# Patient Record
Sex: MALE | Race: WHITE | Employment: FULL TIME | URBAN - METROPOLITAN AREA
[De-identification: names, ages, dates, MRNs, and addresses within clinical notes are randomized per-mention and may not be internally consistent; named-entity substitution may affect disease eponyms.]

---

## 2017-02-20 ENCOUNTER — OFFICE VISIT (OUTPATIENT)
Dept: INTERNAL MEDICINE CLINIC | Age: 62
End: 2017-02-20

## 2017-02-20 VITALS
RESPIRATION RATE: 12 BRPM | SYSTOLIC BLOOD PRESSURE: 120 MMHG | HEART RATE: 44 BPM | TEMPERATURE: 97.8 F | WEIGHT: 199.8 LBS | DIASTOLIC BLOOD PRESSURE: 73 MMHG | BODY MASS INDEX: 29.59 KG/M2 | HEIGHT: 69 IN

## 2017-02-20 DIAGNOSIS — E55.9 VITAMIN D DEFICIENCY: ICD-10-CM

## 2017-02-20 DIAGNOSIS — Z00.00 WELL ADULT EXAM: Primary | ICD-10-CM

## 2017-02-20 DIAGNOSIS — Z77.090 EXPOSURE TO ASBESTOS: ICD-10-CM

## 2017-02-20 DIAGNOSIS — J45.20 MILD INTERMITTENT ASTHMA WITHOUT COMPLICATION: ICD-10-CM

## 2017-02-20 DIAGNOSIS — K64.8 INTERNAL HEMORRHOIDS: ICD-10-CM

## 2017-02-20 RX ORDER — HYDROCORTISONE ACETATE 25 MG/1
25 SUPPOSITORY RECTAL 2 TIMES DAILY
Qty: 24 SUPPOSITORY | Refills: 1 | Status: SHIPPED | OUTPATIENT
Start: 2017-02-20 | End: 2017-11-10 | Stop reason: SDUPTHER

## 2017-02-20 NOTE — PROGRESS NOTES
Radha Kiran is a 62 y.o. male  Presenting for his annual checkup and health maintenance review and follow-up    Not taking vit D for 1 month    Reports his right hip pain is improved, but he has some limited ROM and inability to cross leg. Working full-time without restriction. Exercise: not active  Diet: generally follows a low fat low cholesterol diet  Health Maintenance   Topic Date Due    COLONOSCOPY  01/16/2018    DTaP/Tdap/Td series (3 - Td) 02/15/2026    Hepatitis C Screening  Completed    ZOSTER VACCINE AGE 60>  Completed    Pneumococcal 19-64 Medium Risk  Addressed    INFLUENZA AGE 9 TO ADULT  Addressed     Health Maintenance reviewed  Last digital rectal exam:   Lab Results   Component Value Date/Time    Prostate Specific Ag 0.2 02/15/2016 10:05 AM    Prostate Specific Ag 0.2 09/16/2013 03:01 PM    Prostate Specific Ag 0.4 01/16/2012 12:00 AM       Vaccinations reviewed  Immunization History   Administered Date(s) Administered    Influenza Vaccine 09/16/2013    TDAP Vaccine 01/16/2005    Tdap 05/07/2014, 02/15/2016    Zoster Vaccine, Live 01/02/2017       Past Medical History   Diagnosis Date    Asthma      mild intermittent    Basal cell carcinoma      face, chest?  Dr. Sergio Perez    Chronic sinus brady[facility] ([facility] [address]     Generalized headaches      2008 MRI normal    Insomnia      normal sleep study 2008    Internal hemorrhoids     Low back pain      recurrent, sometimes w L leg numbness    Normal echocardiogram 1/2012    Pericarditis 2011     idiopathic    Plantar fasciitis of left foot      Dr. Jena Wilson. tear 1/14/14. MRI 3/2014 showed tear    Radicular pain of right lower back 6/15/16    Rupture of rectus femoris tendon 06/2016     MRI 7/18/16      has a past surgical history that includes heart catheterization (2011); colonoscopy (3/22/07); and orthopaedic (Left).     Percocet [oxycodone-acetaminophen] and Sulfa (sulfonamide antibiotics)   Current Outpatient Prescriptions Medication Sig    hydrocortisone (ANUSOL-HC) 25 mg supp Insert 1 Suppository into rectum two (2) times a day. Indications: HEMORRHOIDS    folic acid (FOLVITE) 1 mg tablet Take 1 Tab by mouth daily.  cholecalciferol (VITAMIN D3) 1,000 unit tablet Take 1 Tab by mouth daily.  albuterol (PROVENTIL HFA, VENTOLIN HFA, PROAIR HFA) 90 mcg/actuation inhaler Take 2 Puffs by inhalation every six (6) hours as needed for Wheezing.  ASMANEX TWISTHALER 220 mcg (30 doses) inhaler Take 2 Puffs by inhalation daily.  VITAMIN B-12 1,000 mcg tablet Take 1 Tab by mouth daily.  omeprazole (PRILOSEC) 20 mg capsule Take 1 capsule by mouth two (2) times a day. No current facility-administered medications for this visit. SOCIAL HX:  reports that he has never smoked. He has never used smokeless tobacco. He reports that he does not drink alcohol. FAMILY HX: family history includes Arthritis-rheumatoid in his sister; Cancer in his mother; Heart Disease in his father; SLE in his sister; Stroke in his father. Review of Systems - History obtained from the patient  General ROS: negative for - night sweats, weight gain or weight loss  Cardiovascular ROS: no chest pain, dyspnea on exertion, edema    Physical exam  Blood pressure 120/73, pulse (!) 44, temperature 97.8 °F (36.6 °C), temperature source Oral, resp. rate 12, height 5' 8.5\" (1.74 m), weight 199 lb 12.8 oz (90.6 kg). Wt Readings from Last 3 Encounters:   02/20/17 199 lb 12.8 oz (90.6 kg)   10/12/16 193 lb 12.8 oz (87.9 kg)   09/02/16 193 lb 12.8 oz (87.9 kg)     He appears well, alert and oriented x 3, pleasant and cooperative. Vitals as noted. No rashes or significant lesions. Neck supple and free of adenopathy, or masses. No thyromegaly or carotid bruits. Cranial nerves normal. Lungs are clear to auscultation. Heart sounds are normal with no murmurs, clicks, gallops or rubs. Abdomen is soft, non- tender, with no masses or organomegaly.  Extremities, peripheral pulses and reflexes are normal.  . RECTAL/PROSTATE EXAM: deferred  . Skin is without rashes or suspicious lesions. Assessment and Plan:    1. Well adult exam  Can RTC for ANNAMARIE prn.     - hydrocortisone (ANUSOL-HC) 25 mg supp; Insert 1 Suppository into rectum two (2) times a day. Indications: HEMORRHOIDS  Dispense: 24 Suppository; Refill: 1  - LIPID PANEL  - CBC W/O DIFF  - METABOLIC PANEL, COMPREHENSIVE  - PROSTATE SPECIFIC AG    2. Mild intermittent asthma without complication  Mild and stable  - XR CHEST PA LAT; Future    3. Exposure to asbestos - possible hx of this. Check CXR  - XR CHEST PA LAT; Future    4. Internal hemorrhoids  Currently asymptomatic    5.  Vitamin D deficiency  Not taking supplement for 1 months  - VITAMIN D, 25 HYDROXY      The patient is asked to make an attempt to improve diet and exercise patterns  Avoid tobacco products, excessive alcohol    Return for yearly wellness visits

## 2017-02-20 NOTE — PATIENT INSTRUCTIONS

## 2017-02-20 NOTE — MR AVS SNAPSHOT
Visit Information Date & Time Provider Department Dept. Phone Encounter #  
 2/20/2017  4:15 PM Cecily Gayle MD [facility] [address] [id number] Upcoming Health Maintenance Date Due COLONOSCOPY 1/16/2018 DTaP/Tdap/Td series (3 - Td) 2/15/2026 Allergies as of 2/20/2017  Review Complete On: 2/20/2017 By: Rachelle Rust Severity Noted Reaction Type Reactions Percocet [Oxycodone-acetaminophen] Medium 07/11/2016    Rash  
 Sulfa (Sulfonamide Antibiotics) Low 01/16/2012   Side Effect Other (comments) Current Immunizations  Reviewed on 2/26/2016 Name Date Influenza Vaccine 9/16/2013 TDAP Vaccine 1/16/2005 Tdap 2/15/2016, 5/7/2014 Zoster Vaccine, Live 1/2/2017 Not reviewed this visit You Were Diagnosed With   
  
 Codes Comments Well adult exam    -  Primary ICD-10-CM: Z00.00 ICD-9-CM: V70.0 Mild intermittent asthma without complication     AO-92-PM: J45.20 ICD-9-CM: 493.90 Exposure to asbestos     ICD-10-CM: Z77.090 
ICD-9-CM: V15.84 Internal hemorrhoids     ICD-10-CM: K64.8 ICD-9-CM: 455.0 Vitamin D deficiency     ICD-10-CM: E55.9 ICD-9-CM: 268.9 Vitals BP Pulse Temp Resp Height(growth percentile) Weight(growth percentile) 120/73 (BP 1 Location: Left arm, BP Patient Position: Sitting) (!) 44 97.8 °F (36.6 °C) (Oral) 12 5' 8.5\" (1.74 m) 199 lb 12.8 oz (90.6 kg) BMI Smoking Status 29.94 kg/m2 Never Smoker Vitals History BMI and BSA Data Body Mass Index Body Surface Area  
 29.94 kg/m 2 2.09 m 2 Preferred Pharmacy Pharmacy Name Phone [facility], [facility] 53 91 [address] ([address] [phone] Your Updated Medication List  
  
   
This list is accurate as of: 2/20/17  4:48 PM.  Always use your most recent med list.  
  
  
  
  
 albuterol 90 mcg/actuation inhaler Commonly known as:  PROVENTIL HFA, VENTOLIN HFA, PROAIR HFA Take 2 Puffs by inhalation every six (6) hours as needed for Wheezing. ASMANEX TWISTHALER 220 mcg (30 doses) inhaler Generic drug:  mometasone Take 2 Puffs by inhalation daily. cholecalciferol 1,000 unit tablet Commonly known as:  VITAMIN D3 Take 1 Tab by mouth daily. folic acid 1 mg tablet Commonly known as:  Google Take 1 Tab by mouth daily. hydrocortisone 25 mg Supp Commonly known as:  ANUSOL-HC Insert 1 Suppository into rectum two (2) times a day. Indications: HEMORRHOIDS  
  
 omeprazole 20 mg capsule Commonly known as:  PriLOSEC Take 1 capsule by mouth two (2) times a day. VITAMIN B-12 1,000 mcg tablet Generic drug:  cyanocobalamin Take 1 Tab by mouth daily. Prescriptions Sent to Pharmacy Refills  
 hydrocortisone (ANUSOL-HC) 25 mg supp 1 Sig: Insert 1 Suppository into rectum two (2) times a day. Indications: HEMORRHOIDS Class: Normal  
 Pharmacy: [facility] [address] #: [phone] Route: Rectal  
  
We Performed the Following CBC W/O DIFF [40653 CPT(R)] LIPID PANEL [99725 CPT(R)] METABOLIC PANEL, COMPREHENSIVE [34284 CPT(R)] PROSTATE SPECIFIC AG (PSA) [id number] CPT(R)] VITAMIN D, 25 HYDROXY [id number] CPT(R)] To-Do List   
 Around 02/20/2017 Imaging:  XR CHEST PA LAT Patient Instructions Well Visit, Men 48 to 72: Care Instructions Your Care Instructions Physical exams can help you stay healthy. Your doctor has checked your overall health and may have suggested ways to take good care of yourself. He or she also may have recommended tests. At home, you can help prevent illness with healthy eating, regular exercise, and other steps. Follow-up care is a key part of your treatment and safety.  Be sure to make and go to all appointments, and call your doctor if you are having problems. It's also a good idea to know your test results and keep a list of the medicines you take. How can you care for yourself at home? · Reach and stay at a healthy weight. This will lower your risk for many problems, such as obesity, diabetes, heart disease, and high blood pressure. · Get at least 30 minutes of exercise on most days of the week. Walking is a good choice. You also may want to do other activities, such as running, swimming, cycling, or playing tennis or team sports. · Do not smoke. Smoking can make health problems worse. If you need help quitting, talk to your doctor about stop-smoking programs and medicines. These can increase your chances of quitting for good. · Protect your skin from too much sun. When you're outdoors from 10 a.m. to 4 p.m., stay in the shade or cover up with clothing and a hat with a wide brim. Wear sunglasses that block UV rays. Even when it's cloudy, put broad-spectrum sunscreen (SPF 30 or higher) on any exposed skin. · See a dentist one or two times a year for checkups and to have your teeth cleaned. · Wear a seat belt in the car. · Limit alcohol to 2 drinks a day. Too much alcohol can cause health problems. Follow your doctor's advice about when to have certain tests. These tests can spot problems early. · Cholesterol. Your doctor will tell you how often to have this done based on your overall health and other things that can increase your risk for heart attack and stroke. · Blood pressure. Have your blood pressure checked during a routine doctor visit. Your doctor will tell you how often to check your blood pressure based on your age, your blood pressure results, and other factors. · Prostate exam. Talk to your doctor about whether you should have a blood test (called a PSA test) for prostate cancer.  Experts disagree on whether men should have this test. Some experts recommend that you discuss the benefits and risks of the test with your doctor. · Diabetes. Ask your doctor whether you should have tests for diabetes. · Vision. Some experts recommend that you have yearly exams for glaucoma and other age-related eye problems starting at age 48. · Hearing. Tell your doctor if you notice any change in your hearing. You can have tests to find out how well you hear. · Colon cancer. You should begin tests for colon cancer at age 48. You may have one of several tests. Your doctor will tell you how often to have tests based on your age and risk. Risks include whether you already had a precancerous polyp removed from your colon or whether your parent, brother, sister, or child has had colon cancer. · Heart attack and stroke risk. At least every 4 to 6 years, you should have your risk for heart attack and stroke assessed. Your doctor uses factors such as your age, blood pressure, cholesterol, and whether you smoke or have diabetes to show what your risk for a heart attack or stroke is over the next 10 years. · Abdominal aortic aneurysm. Ask your doctor whether you should have a test to check for an aneurysm. You may need a test if you ever smoked or if your parent, brother, sister, or child has had an aneurysm. When should you call for help? Watch closely for changes in your health, and be sure to contact your doctor if you have any problems or symptoms that concern you. Where can you learn more? Go to http://nuha-leandro.info/. Enter K778 in the search box to learn more about \"Well Visit, Men 48 to 72: Care Instructions. \" Current as of: July 19, 2016 Content Version: 11.1 © 8230-8929 Healthwise, Incorporated. Care instructions adapted under license by [employer] (which disclaims liability or warranty for this information).  If you have questions about a medical condition or this instruction, always ask your healthcare professional. [address] any warranty or liability for your use of this information. Introducing [facility] & HEALTH SERVICES! Dear Cinthia Carbone: 
Thank you for requesting a [employer] account. Our records indicate that you already have an active [employer] account. You can access your account anytime at [email or web address] Did you know that you can access your hospital and ER discharge instructions at any time in [employer]? You can also review all of your test results from your hospital stay or ER visit. Additional Information If you have questions, please visit the Frequently Asked Questions section of the [employer] website at [email or web address] Remember, [employer] is NOT to be used for urgent needs. For medical emergencies, dial 911. Now available from your iPhone and Android! Please provide this summary of care documentation to your next provider. Your primary care clinician is listed as Radha Vo. If you have any questions after today's visit, please call [phone].

## 2017-04-11 ENCOUNTER — HOSPITAL ENCOUNTER (OUTPATIENT)
Dept: GENERAL RADIOLOGY | Age: 62
Discharge: HOME | End: 2017-04-11
Attending: INTERNAL MEDICINE
Payer: COMMERCIAL

## 2017-04-11 DIAGNOSIS — J45.20 MILD INTERMITTENT ASTHMA WITHOUT COMPLICATION: ICD-10-CM

## 2017-04-11 DIAGNOSIS — Z77.090 EXPOSURE TO ASBESTOS: ICD-10-CM

## 2017-04-11 PROCEDURE — 71020 XR CHEST PA LAT: CPT

## 2017-04-12 LAB
ALBUMIN SERPL-MCNC: 4.4 G/DL (ref 3.6–4.8)
ALBUMIN/GLOB SERPL: 1.5 {RATIO} (ref 1.2–2.2)
ALP SERPL-CCNC: 60 IU/L (ref 39–117)
ALT SERPL-CCNC: 19 IU/L (ref 0–44)
AST SERPL-CCNC: 20 IU/L (ref 0–40)
BILIRUB SERPL-MCNC: 0.4 MG/DL (ref 0–1.2)
BUN SERPL-MCNC: 15 MG/DL (ref 8–27)
BUN/CREAT SERPL: 14 (ref 10–24)
CALCIUM SERPL-MCNC: 9.2 MG/DL (ref 8.6–10.2)
CHLORIDE SERPL-SCNC: 98 MMOL/L (ref 96–106)
CHOLEST SERPL-MCNC: 210 MG/DL (ref 100–199)
CO2 SERPL-SCNC: 25 MMOL/L (ref 18–29)
CREAT SERPL-MCNC: 1.06 MG/DL (ref 0.76–1.27)
ERYTHROCYTE [DISTWIDTH] IN BLOOD BY AUTOMATED COUNT: 13 % (ref 12.3–15.4)
GLOBULIN SER CALC-MCNC: 3 G/DL (ref 1.5–4.5)
GLUCOSE SERPL-MCNC: 94 MG/DL (ref 65–99)
HCT VFR BLD AUTO: 45.9 % (ref 37.5–51)
HDLC SERPL-MCNC: 62 MG/DL
HGB BLD-MCNC: 16.1 G/DL (ref 12.6–17.7)
INTERPRETATION, 910389: NORMAL
LDLC SERPL CALC-MCNC: 134 MG/DL (ref 0–99)
MCH RBC QN AUTO: 31.7 PG (ref 26.6–33)
MCHC RBC AUTO-ENTMCNC: 35.1 G/DL (ref 31.5–35.7)
MCV RBC AUTO: 90 FL (ref 79–97)
PLATELET # BLD AUTO: 236 X10E3/UL (ref 150–379)
POTASSIUM SERPL-SCNC: 4.7 MMOL/L (ref 3.5–5.2)
PROT SERPL-MCNC: 7.4 G/DL (ref 6–8.5)
PSA SERPL-MCNC: 0.2 NG/ML (ref 0–4)
RBC # BLD AUTO: 5.08 X10E6/UL (ref 4.14–5.8)
SODIUM SERPL-SCNC: 140 MMOL/L (ref 134–144)
TRIGL SERPL-MCNC: 71 MG/DL (ref 0–149)
VLDLC SERPL CALC-MCNC: 14 MG/DL (ref 5–40)
WBC # BLD AUTO: 5.4 X10E3/UL (ref 3.4–10.8)

## 2017-05-08 ENCOUNTER — OFFICE VISIT (OUTPATIENT)
Dept: INTERNAL MEDICINE CLINIC | Age: 62
End: 2017-05-08

## 2017-05-08 VITALS
BODY MASS INDEX: 30.07 KG/M2 | WEIGHT: 203 LBS | HEART RATE: 52 BPM | SYSTOLIC BLOOD PRESSURE: 127 MMHG | HEIGHT: 69 IN | DIASTOLIC BLOOD PRESSURE: 74 MMHG | RESPIRATION RATE: 20 BRPM | TEMPERATURE: 97.7 F

## 2017-05-08 DIAGNOSIS — R00.1 CHRONIC SINUS BRADYCARDIA: ICD-10-CM

## 2017-05-08 DIAGNOSIS — J45.20 MILD INTERMITTENT ASTHMA WITHOUT COMPLICATION: ICD-10-CM

## 2017-05-08 DIAGNOSIS — R53.83 FATIGUE, UNSPECIFIED TYPE: Primary | ICD-10-CM

## 2017-05-08 NOTE — PROGRESS NOTES
HISTORY OF PRESENT ILLNESS    Chief Complaint   Patient presents with    Follow-up       Presents for follow-up. He would like a physical.    He was billed for CPE 2/2/17 since we addressed wellness needs, but complete physical exam not done. No acute concerns. He ws concerned about mild fatigue and feeling his memory was not quite as sharp, so he started prevagen and some vitamin supplements      Review of Systems   All other systems reviewed and are negative, except as noted in HPI    Past Medical and Surgical History   has a past medical history of Asthma; Basal cell carcinoma; Chronic sinus bradycardia ([address] Generalized headaches; Insomnia; Internal hemorrhoids; Low back pain; Normal echocardiogram (1/2012); Pericarditis (2011); Plantar fasciitis of left foot; Radicular pain of right lower back (6/15/16); and Rupture of rectus femoris tendon (06/2016). has a past surgical history that includes heart catheterization (2011); colonoscopy (3/22/07); and orthopaedic (Left). reports that he has never smoked. He has never used smokeless tobacco. He reports that he does not drink alcohol.  family history includes Arthritis-rheumatoid in his sister; Cancer in his mother; Heart Disease in his father; SLE in his sister; Stroke in his father. Physical Exam   Nursing note and vitals reviewed. Blood pressure 127/74, pulse (!) 52, temperature 97.7 °F (36.5 °C), temperature source Oral, resp. rate 20, height 5' 8.5\" (1.74 m), weight 203 lb (92.1 kg). Constitutional:  No distress. Eyes: Conjunctivae are normal.   Ears:  Hearing grossly intact  Cardiovascular: Normal rate. regular rhythm, no murmurs or gallops  No edema  Pulmonary/Chest: Effort normal.   CTAB  Musculoskeletal: moves all 4 extremities   Neurological: Alert and oriented to person, place, and time. Skin: No rash noted. Psychiatric: Normal mood and affect.  Behavior is normal.   ANNAMARIE - normal prostate, no nodules    ASSESSMENT and Luisa Schmidt was seen today for follow-up. Diagnoses and all orders for this visit:    Fatigue, unspecified type- mild. Unclear benefits from his vitamins    Mild intermittent asthma without complication - Controlled on current regimen. Continue current medications as written in chart    Chronic sinus bradycardia (HCC) - Currently asymptomatic    lab results and schedule of future lab studies reviewed with patient  reviewed medications and side effects in detail    Return to clinic for further evaluation if new symptoms develop    Follow-up Disposition: Not on File    Current Outpatient Prescriptions   Medication Sig    multivitamin, tx-iron-ca-min (THERA-M W/ IRON) 9 mg iron-400 mcg tab tablet Take 1 Tab by mouth daily.  OTHER Prevagen daily    VIT C/VIT E/LUTEIN/MIN/OMEGA-3 (OCUVITE PO) Take  by mouth.  hydrocortisone (ANUSOL-HC) 25 mg supp Insert 1 Suppository into rectum two (2) times a day. Indications: HEMORRHOIDS    folic acid (FOLVITE) 1 mg tablet Take 1 Tab by mouth daily.  cholecalciferol (VITAMIN D3) 1,000 unit tablet Take 1 Tab by mouth daily.  albuterol (PROVENTIL HFA, VENTOLIN HFA, PROAIR HFA) 90 mcg/actuation inhaler Take 2 Puffs by inhalation every six (6) hours as needed for Wheezing.  ASMANEX TWISTHALER 220 mcg (30 doses) inhaler Take 2 Puffs by inhalation daily.  omeprazole (PRILOSEC) 20 mg capsule Take 1 capsule by mouth two (2) times a day. No current facility-administered medications for this visit.

## 2017-05-08 NOTE — MR AVS SNAPSHOT
Visit Information Date & Time Provider Department Dept. Phone Encounter #  
 5/8/2017  2:45 PM Juliet Rosales MD [facility] [address] [id number] Upcoming Health Maintenance Date Due INFLUENZA AGE 9 TO ADULT 8/1/2017 COLONOSCOPY 1/16/2018 DTaP/Tdap/Td series (3 - Td) 2/15/2026 Allergies as of 5/8/2017  Review Complete On: 5/8/2017 By: Litzy Stephens Severity Noted Reaction Type Reactions Percocet [Oxycodone-acetaminophen] Medium 07/11/2016    Rash  
 Sulfa (Sulfonamide Antibiotics) Low 01/16/2012   Side Effect Other (comments) Current Immunizations  Reviewed on 5/8/2017 Name Date Influenza Vaccine 9/16/2013 TDAP Vaccine 1/16/2005 Tdap 2/15/2016, 5/7/2014 Zoster Vaccine, Live 1/2/2017 Reviewed by Juliet Roasles MD on 5/8/2017 at  3:37 PM  
You Were Diagnosed With   
  
 Codes Comments Fatigue, unspecified type    -  Primary ICD-10-CM: R53.83 ICD-9-CM: 780.79 Mild intermittent asthma without complication     [facility]-: J45.20 ICD-9-CM: 493.90 Chronic sinus bradycardia (HCC)     ICD-10-CM: R00.1 ICD-9-CM: 427.81 Vitals BP Pulse Temp Resp Height(growth percentile) Weight(growth percentile) 127/74 (BP 1 Location: Left arm, BP Patient Position: Sitting) (!) 52 97.7 °F (36.5 °C) (Oral) 20 5' 8.5\" (1.74 m) 203 lb (92.1 kg) BMI Smoking Status 30.42 kg/m2 Never Smoker Vitals History BMI and BSA Data Body Mass Index Body Surface Area  
 30.42 kg/m 2 2.11 m 2 Preferred Pharmacy Pharmacy Name Phone 310 [facility], [facility] 53 [address] ([address] [phone] Your Updated Medication List  
  
   
This list is accurate as of: 5/8/17  3:54 PM.  Always use your most recent med list.  
  
  
  
  
 albuterol 90 mcg/actuation inhaler Commonly known as:  PROVENTIL HFA, VENTOLIN HFA, PROAIR HFA  
 Take 2 Puffs by inhalation every six (6) hours as needed for Wheezing. ASMANEX TWISTHALER 220 mcg (30 doses) inhaler Generic drug:  mometasone Take 2 Puffs by inhalation daily. cholecalciferol 1,000 unit tablet Commonly known as:  VITAMIN D3 Take 1 Tab by mouth daily. folic acid 1 mg tablet Commonly known as:  Google Take 1 Tab by mouth daily. hydrocortisone 25 mg Supp Commonly known as:  ANUSOL-HC Insert 1 Suppository into rectum two (2) times a day. Indications: HEMORRHOIDS  
  
 multivitamin, tx-iron-ca-min 9 mg iron-400 mcg Tab tablet Commonly known as:  THERA-M w/ IRON Take 1 Tab by mouth daily. OCUVITE PO Take  by mouth. omeprazole 20 mg capsule Commonly known as:  PriLOSEC Take 1 capsule by mouth two (2) times a day. OTHER Prevagen daily Introducing [facility] & [facility] SERVICES! Dear Cherl Blizzard: 
Thank you for requesting a [employer] account. Our records indicate that you already have an active [employer] account. You can access your account anytime at [email or web address] Did you know that you can access your hospital and ER discharge instructions at any time in [employer] You can also review all of your test results from your hospital stay or ER visit. Additional Information If you have questions, please visit the Frequently Asked Questions section of the [employer] website at [email or web address] Remember, [employer] is NOT to be used for urgent needs. For medical emergencies, dial 911. Now available from your iPhone and Android! Please provide this summary of care documentation to your next provider. Your primary care clinician is listed as Tammi Foley. If you have any questions after today's visit, please call [phone].

## 2017-06-05 DIAGNOSIS — J45.20 MILD INTERMITTENT ASTHMA WITHOUT COMPLICATION: ICD-10-CM

## 2017-06-05 RX ORDER — MOMETASONE FUROATE 220 UG/1
2 INHALANT RESPIRATORY (INHALATION) DAILY
Qty: 1 INHALER | Refills: 11 | Status: SHIPPED | OUTPATIENT
Start: 2017-06-05 | End: 2017-06-07 | Stop reason: SDUPTHER

## 2017-06-07 DIAGNOSIS — J45.20 MILD INTERMITTENT ASTHMA WITHOUT COMPLICATION: ICD-10-CM

## 2017-06-16 RX ORDER — FLUTICASONE PROPIONATE 220 UG/1
1 AEROSOL, METERED RESPIRATORY (INHALATION) 2 TIMES DAILY
Qty: 1 INHALER | Refills: 3 | Status: SHIPPED | OUTPATIENT
Start: 2017-06-16 | End: 2019-01-03 | Stop reason: ALTCHOICE

## 2017-11-10 DIAGNOSIS — Z00.00 WELL ADULT EXAM: ICD-10-CM

## 2017-11-10 RX ORDER — HYDROCORTISONE ACETATE 25 MG/1
25 SUPPOSITORY RECTAL 2 TIMES DAILY
Qty: 24 SUPPOSITORY | Refills: 1 | Status: SHIPPED | OUTPATIENT
Start: 2017-11-10 | End: 2018-05-02 | Stop reason: ALTCHOICE

## 2017-12-06 ENCOUNTER — TELEPHONE (OUTPATIENT)
Dept: INTERNAL MEDICINE CLINIC | Age: 62
End: 2017-12-06

## 2017-12-06 NOTE — TELEPHONE ENCOUNTER
Pt's wife called , Radha Shoulder. States pt is having right testicle pain that shoots into his abd. Pt is very uncomfortable. Pt is scheduled for Monday but Wife was hoping they can be seen sooner.

## 2018-05-02 ENCOUNTER — OFFICE VISIT (OUTPATIENT)
Dept: INTERNAL MEDICINE CLINIC | Age: 63
End: 2018-05-02

## 2018-05-02 VITALS
WEIGHT: 199.2 LBS | OXYGEN SATURATION: 96 % | SYSTOLIC BLOOD PRESSURE: 126 MMHG | RESPIRATION RATE: 16 BRPM | DIASTOLIC BLOOD PRESSURE: 79 MMHG | HEART RATE: 54 BPM | BODY MASS INDEX: 29.51 KG/M2 | TEMPERATURE: 98.9 F | HEIGHT: 69 IN

## 2018-05-02 DIAGNOSIS — M77.8 LEFT ELBOW TENDONITIS: Primary | ICD-10-CM

## 2018-05-02 DIAGNOSIS — R25.2 HAND CRAMP: ICD-10-CM

## 2018-05-02 RX ORDER — PYRIDOXINE HCL (VITAMIN B6) 100 MG
100 TABLET ORAL DAILY
COMMUNITY
End: 2019-11-11 | Stop reason: ALTCHOICE

## 2018-05-02 RX ORDER — DICLOFENAC SODIUM 75 MG/1
75 TABLET, DELAYED RELEASE ORAL 2 TIMES DAILY
Qty: 60 TAB | Refills: 0 | Status: SHIPPED | OUTPATIENT
Start: 2018-05-02 | End: 2018-09-07 | Stop reason: ALTCHOICE

## 2018-05-02 NOTE — MR AVS SNAPSHOT
[place] 
 
 
 [address] [facility] 
[phone] Patient: Joe Mullins MRN: [id number] A:[birth year] Visit Information Date & Time Provider Department Dept. Phone Encounter #  
 5/2/2018 11:30 AM Noe Irizarry MD [facility] [address] [place]  [id number] Upcoming Health Maintenance Date Due Influenza Age 5 to Adult 8/1/2018 DTaP/Tdap/Td series (3 - Td) 2/15/2026 COLONOSCOPY 9/22/2027 Allergies as of 5/2/2018  Review Complete On: 5/2/2018 By: Noe Irizarry MD  
  
 Severity Noted Reaction Type Reactions Percocet [Oxycodone-acetaminophen] Medium 07/11/2016    Rash  
 Sulfa (Sulfonamide Antibiotics) Low 01/16/2012   Side Effect Other (comments) Current Immunizations  Reviewed on 5/8/2017 Name Date Influenza Vaccine 9/16/2013 TDAP Vaccine 1/16/2005 Tdap 2/15/2016, 5/7/2014 Zoster Vaccine, Live 1/2/2017 Not reviewed this visit You Were Diagnosed With   
  
 Codes Comments Left elbow tendonitis    -  Primary ICD-10-CM: M77.8 ICD-9-CM: 727.09 Hand cramp     ICD-10-CM: R25.2 ICD-9-CM: 729.82 Vitals BP Pulse Temp Resp Height(growth percentile) Weight(growth percentile) 126/79 (!) 54 98.9 °F (37.2 °C) (Oral) 16 5' 8.5\" (1.74 m) 199 lb 3.2 oz (90.4 kg) SpO2 BMI Smoking Status 96% 29.85 kg/m2 Never Smoker BMI and BSA Data Body Mass Index Body Surface Area  
 29.85 kg/m 2 2.09 m 2 Preferred Pharmacy Pharmacy Name Phone [address] [facility], [facility] [address] ([phone] Your Updated Medication List  
  
   
This list is accurate as of 5/2/18 12:25 PM.  Always use your most recent med list.  
  
  
  
  
 albuterol 90 mcg/actuation inhaler Commonly known as:  PROVENTIL HFA, VENTOLIN HFA, PROAIR HFA  
 Take 2 Puffs by inhalation every six (6) hours as needed for Wheezing. cholecalciferol 1,000 unit tablet Commonly known as:  VITAMIN D3 Take 1 Tab by mouth daily. diclofenac EC 75 mg EC tablet Commonly known as:  VOLTAREN Take 1 Tab by mouth two (2) times a day. For pain  
  
 fluticasone 220 mcg/actuation inhaler Commonly known as:  FLOVENT HFA Take 1 Puff by inhalation two (2) times a day. mometasone 220 mcg (120 doses) Aepb inhaler Commonly known as:  Scherrie  Take 1 Puff by inhalation two (2) times a day. multivitamin, tx-iron-ca-min 9 mg iron-400 mcg Tab tablet Commonly known as:  THERA-M w/ IRON Take 1 Tab by mouth daily. OCUVITE PO Take  by mouth. omeprazole 20 mg capsule Commonly known as:  PriLOSEC Take 1 capsule by mouth two (2) times a day. VITAMIN B-6 100 mg tablet Generic drug:  pyridoxine (vitamin B6) Take 100 mg by mouth daily. Prescriptions Sent to Pharmacy Refills  
 diclofenac EC (VOLTAREN) 75 mg EC tablet 0 Sig: Take 1 Tab by mouth two (2) times a day. For pain  
 Class: Normal  
 Pharmacy: [facility] [address] #: [phone] Route: Oral  
  
Patient Instructions Tennis Elbow: Exercises Your Care Instructions Here are some examples of typical rehabilitation exercises for your condition. Start each exercise slowly. Ease off the exercise if you start to have pain. Your doctor or physical therapist will tell you when you can start these exercises and which ones will work best for you. How to do the exercises Wrist flexor stretch 1. Extend your arm in front of you with your palm up. 2. Bend your wrist, pointing your hand toward the floor. 3. With your other hand, gently bend your wrist farther until you feel a mild to moderate stretch in your forearm. 4. Hold for at least 15 to 30 seconds. Repeat 2 to 4 times. Wrist extensor stretch 1. Repeat steps 1 to 4 of the stretch above but begin with your extended hand palm down. Ball or sock squeeze 1. Hold a tennis ball (or a rolled-up sock) in your hand. 2. Make a fist around the ball (or sock) and squeeze. 3. Hold for about 6 seconds, and then relax for up to 10 seconds. 4. Repeat 8 to 12 times. 5. Switch the ball (or sock) to your other hand and do 8 to 12 times. Wrist deviation 1. Sit so that your arm is supported but your hand hangs off the edge of a flat surface, such as a table. 2. Hold your hand out like you are shaking hands with someone. 3. Move your hand up and down. 4. Repeat this motion 8 to 12 times. 5. Switch arms. 6. Try to do this exercise twice with each hand. Wrist curls 1. Place your forearm on a table with your hand hanging over the edge of the table, palm up. 2. Place a 1- to 2-pound weight in your hand. This may be a dumbbell, a can of food, or a filled water bottle. 3. Slowly raise and lower the weight while keeping your forearm on the table and your palm facing up. 4. Repeat this motion 8 to 12 times. 5. Switch arms, and do steps 1 through 4. 
6. Repeat with your hand facing down toward the floor. Switch arms. Biceps curls 1. Sit leaning forward with your legs slightly spread and your left hand on your left thigh. 2. Place your right elbow on your right thigh, and hold the weight with your forearm horizontal. 
3. Slowly curl the weight up and toward your chest. 
4. Repeat this motion 8 to 12 times. 5. Switch arms, and do steps 1 through 4. Follow-up care is a key part of your treatment and safety. Be sure to make and go to all appointments, and call your doctor if you are having problems. It's also a good idea to know your test results and keep a list of the medicines you take. Where can you learn more? Go to http://nuha-leandro.info/. Enter B991 in the search box to learn more about \"Tennis Elbow: Exercises. \" Current as of: March 21, 2017 Content Version: 11.4 © 8182-3830 [email or web address]. Care instructions adapted under license by [employer] (which disclaims liability or warranty for this information). If you have questions about a medical condition or this instruction, always ask your healthcare professional. [address] any warranty or liability for your use of this information. Introducing [facility]! Dear Ghazala Tyler: 
Thank you for requesting a saambaa account. Our records indicate that you already have an active saambaa account. You can access your account anytime at [email or web address] Warby [email or web address]/[employer]oren [email or web address] Did you know that you can access your hospital and ER discharge instructions at any time in saambaa? You can also review all of your test results from your hospital stay or ER visit. Additional Information If you have questions, please visit the Frequently Asked Questions section of the saambaa website at [email or web address] Remember, saambaa is NOT to be used for urgent needs. For medical emergencies, dial 911. Now available from your iPhone and Android! Please provide this summary of care documentation to your next provider. Your primary care clinician is listed as Donte Lindsay. If you have any questions after today's visit, please call [phone].

## 2018-05-02 NOTE — PROGRESS NOTES
HISTORY OF PRESENT ILLNESS  Khang Salas is a 65 y.o. male. HPI  Presents with left elbow pain. Onset was 3 week(s) ago. He is doing a lot of house work. Is right-handed. Severity is moderate  Character of problem: hurts to lift and extend. Mild pain of left shoulder as well. Cramping of hand at times. No weakness of hand, but arm may feel a little weak at times. Lifting, sleeping on it makes the problem worse. rest makes the problem better. Associated symptoms include:  Treatments tried include: medication not used    ROS    Physical Exam   Constitutional: He is oriented to person, place, and time. He appears well-developed and well-nourished. No distress. Cardiovascular: Normal rate. Pulmonary/Chest: Effort normal.   Musculoskeletal: He exhibits no edema. Left elbow: Tenderness found. Lateral epicondyle tenderness noted. No medial epicondyle and no olecranon process tenderness noted. Arms:  Neurological: He is alert and oriented to person, place, and time. Psychiatric: He has a normal mood and affect. His behavior is normal. Judgment and thought content normal.   Nursing note and vitals reviewed. ASSESSMENT and PLAN  Diagnoses and all orders for this visit:    1. Left elbow tendonitis  2. Hand cramp  Stretching exercises demonstrated for for this condition  -     Try diclofenac EC (VOLTAREN) 75 mg EC tablet; Take 1 Tab by mouth two (2) times a day.  For pain  Consider PT

## 2018-05-02 NOTE — PROGRESS NOTES
C/0 left arm and elbow painful , sleeps on left side , some weakness noted in left arm some pain in left shoulder .  Also per pt Arm feels inflamed

## 2018-06-21 DIAGNOSIS — Z00.00 WELL ADULT EXAM: ICD-10-CM

## 2018-06-21 RX ORDER — HYDROCORTISONE ACETATE 25 MG/1
25 SUPPOSITORY RECTAL 2 TIMES DAILY
Qty: 24 SUPPOSITORY | Refills: 1 | Status: SHIPPED | OUTPATIENT
Start: 2018-06-21 | End: 2019-01-03 | Stop reason: ALTCHOICE

## 2018-09-07 ENCOUNTER — OFFICE VISIT (OUTPATIENT)
Dept: INTERNAL MEDICINE CLINIC | Age: 63
End: 2018-09-07

## 2018-09-07 VITALS
DIASTOLIC BLOOD PRESSURE: 78 MMHG | SYSTOLIC BLOOD PRESSURE: 136 MMHG | HEIGHT: 68 IN | WEIGHT: 204 LBS | TEMPERATURE: 98.5 F | BODY MASS INDEX: 30.92 KG/M2 | RESPIRATION RATE: 18 BRPM | OXYGEN SATURATION: 94 % | HEART RATE: 53 BPM

## 2018-09-07 DIAGNOSIS — F32.0 CURRENT MILD EPISODE OF MAJOR DEPRESSIVE DISORDER WITHOUT PRIOR EPISODE (HCC): ICD-10-CM

## 2018-09-07 DIAGNOSIS — F51.04 PSYCHOPHYSIOLOGICAL INSOMNIA: ICD-10-CM

## 2018-09-07 DIAGNOSIS — B37.2 CANDIDAL INTERTRIGO: Primary | ICD-10-CM

## 2018-09-07 RX ORDER — NYSTATIN 100000 [USP'U]/G
POWDER TOPICAL
Qty: 30 G | Refills: 5 | Status: SHIPPED | OUTPATIENT
Start: 2018-09-07 | End: 2019-01-03 | Stop reason: ALTCHOICE

## 2018-09-07 RX ORDER — NYSTATIN AND TRIAMCINOLONE ACETONIDE 100000; 1 [USP'U]/G; MG/G
CREAM TOPICAL 2 TIMES DAILY
Qty: 30 G | Refills: 0 | Status: SHIPPED | OUTPATIENT
Start: 2018-09-07 | End: 2019-01-03 | Stop reason: ALTCHOICE

## 2018-09-07 RX ORDER — TRAZODONE HYDROCHLORIDE 50 MG/1
50 TABLET ORAL
Qty: 30 TAB | Refills: 0 | Status: SHIPPED | OUTPATIENT
Start: 2018-09-07 | End: 2019-01-03 | Stop reason: ALTCHOICE

## 2018-09-07 NOTE — PATIENT INSTRUCTIONS
Yeast Skin Infection: Care Instructions  Your Care Instructions    Yeast normally lives on your skin. Sometimes too much yeast can overgrow in certain areas of the skin and cause an infection. The infection causes red, scaly, moist patches on your skin that may itch. Common areas for skin yeast infections are skin folds under the breasts or belly area. The warm and moist areas in the skin folds can make it easier for yeast to overgrow. Yeast infections also can be found on other parts of the body such as the groin or armpits. You will probably get a cream or ointment that contains an antifungal medicine. Examples of these are miconazole and clotrimazole. You put it on your skin to treat the infection. Your doctor may give you a prescription for the cream or ointment. Or you may be able to buy it without a prescription at most drugstores. If the infection is severe, the doctor will prescribe antifungal pills. A yeast infection usually goes away after about a week of treatment. But it's important to use the medicine for as long as your doctor tells you to. Follow-up care is a key part of your treatment and safety. Be sure to make and go to all appointments, and call your doctor if you are having problems. It's also a good idea to know your test results and keep a list of the medicines you take. How can you care for yourself at home? · Be safe with medicines. Take your medicines exactly as prescribed. Call your doctor if you think you are having a problem with your medicine. · Keep your skin clean and dry. Your doctor may suggest using powder that contains an antifungal medicine in the skin folds. · Wear loose clothing. When should you call for help? Call your doctor now or seek immediate medical care if:    · You have symptoms of infection, such as:  ¨ Increased pain, swelling, warmth, or redness. ¨ Red streaks leading from the area. ¨ Pus draining from the area.   ¨ A fever.    Watch closely for changes in your health, and be sure to contact your doctor if:    · You do not get better as expected. Where can you learn more? Go to http://nuha-leandro.info/. Enter V204 in the search box to learn more about \"Yeast Skin Infection: Care Instructions. \"  Current as of: October 5, 2017  Content Version: 11.7  © 7428-1923 [employer]. Care instructions adapted under license by [employer] (which disclaims liability or warranty for this information). If you have questions about a medical condition or this instruction, always ask your healthcare professional. [address] any warranty or liability for your use of this information. Learning About Mood Disorders  What are mood disorders? Mood disorders are medical problems that affect how you feel. They can impact your moods, thoughts, and actions. Mood disorders include:  · Depression. This causes you to feel sad or hopeless for much of the time. · Bipolar disorder. This causes extreme mood changes from manic episodes of very high energy to extreme lows of depression. · Seasonal affective disorder (SAD). This is a type of depression that affects you during the same season each year. Most often people experience SAD during the fall and winter months when days are shorter and there is less light. What are the symptoms? Depression  You may:  · Feel sad or hopeless nearly every day. · Lose interest in or not get pleasure from most daily activities. You feel this way nearly every day. · Have low energy, changes in your appetite, or changes in how well you sleep. · Have trouble concentrating. · Think about death and suicide. Keep the numbers for these national suicide hotlines: 4-289-859-TALK (7-665.462.4848) and 1-477-WXJQWFK (9-979.977.7361). If you or someone you know talks about suicide or feeling hopeless, get help right away. Bipolar disorder  Symptoms depend on your mood swings.  You may:  · Feel very happy, energetic, or on edge. · Feel like you need very little sleep. · Feel overly self-confident. · Do impulsive things, such as spending a lot of money. · Feel sad or hopeless. · Have racing thoughts or trouble thinking and making decisions. · Lose interest in things you have enjoyed in the past.  · Think about death and suicide. Keep the numbers for these national suicide hotlines: 3-397-375-TALK (1-585.803.4595) and 6-996-NFJMETG (5-721.713.3737). If you or someone you know talks about suicide or feeling hopeless, get help right away. Seasonal affective disorder (SAD)  Symptoms come and go at about the same time each year. For most people with SAD, symptoms come during the winter when there is less daylight. You may:  · Feel sad, grumpy, matamoros, or anxious. · Lose interest in your usual activities. · Eat more and crave carbohydrates, such as bread and pasta. · Gain weight. · Sleep more and feel drowsy during the daytime. How are mood disorders treated? Mood disorders can be treated with medicines or counseling, or a combination of both. Medicines for depression and SAD may include antidepressants. Medicines for bipolar disorder may include:  · Mood stabilizers. · Antipsychotics. · Benzodiazepines. Counseling may involve cognitive-behavioral therapy. It teaches you how to change the ways you think and behave. This can help you stop thinking bad thoughts about yourself and your life. Light therapy is the main treatment for SAD. This therapy uses a special kind of lamp. You let the lamp shine on you at certain times, usually in the morning. This may help your symptoms during the months when there is less sunlight. Healthy lifestyle  Healthy lifestyle changes may help you feel better. · Get at least 30 minutes of exercise on most days of the week. Walking is a good choice. · Eat a healthy diet.  Include fruits, vegetables, lean proteins, and whole grains in your diet each day.  · Keep a regular sleep schedule. Try for 8 hours of sleep a night. · Find ways to manage stress, such as relaxation exercises. · Avoid alcohol and illegal drugs. Follow-up care is a key part of your treatment and safety. Be sure to make and go to all appointments, and call your doctor if you are having problems. It's also a good idea to know your test results and keep a list of the medicines you take. Where can you learn more? Go to http://nuha-leandro.info/. Enter D173 in the search box to learn more about \"Learning About Mood Disorders. \"  Current as of: December 7, 2017  Content Version: 11.7  © 4229-8818 [employer]. Care instructions adapted under license by [employer] (which disclaims liability or warranty for this information). If you have questions about a medical condition or this instruction, always ask your healthcare professional. [address] any warranty or liability for your use of this information.

## 2018-09-07 NOTE — MR AVS SNAPSHOT
[address] [facility] 
 
 
 [address] 
[phone] Patient: Lisa Alaniz MRN: [id number] FRV:[id number] Visit Information Date & Time Provider Department Dept. Phone Encounter #  
 9/7/2018  4:00 PM Emily Vann MD [facility] [address] [facility] [id number] Upcoming Health Maintenance Date Due Influenza Age 5 to Adult 8/1/2018 DTaP/Tdap/Td series (3 - Td) 2/15/2026 COLONOSCOPY 9/22/2027 Allergies as of 9/7/2018  Review Complete On: 9/7/2018 By: Emily Vann MD  
  
 Severity Noted Reaction Type Reactions Percocet [Oxycodone-acetaminophen] Medium 07/11/2016    Rash  
 Sulfa (Sulfonamide Antibiotics) Low 01/16/2012   Side Effect Other (comments) Current Immunizations  Reviewed on 5/8/2017 Name Date Influenza Vaccine 9/16/2013 TDAP Vaccine 1/16/2005 Tdap 2/15/2016, 5/7/2014 Zoster Vaccine, Live 1/2/2017 Not reviewed this visit You Were Diagnosed With   
  
 Codes Comments Candidal intertrigo    -  Primary ICD-10-CM: B37.2 ICD-9-CM: 112.3 Current mild episode of major depressive disorder [facility]     ICD-10-CM: F32.0 ICD-9-CM: 296.21 Psychophysiological insomnia     ICD-10-CM: F51.04 
ICD-9-CM: 307.42 Vitals BP Pulse Temp Resp Height(growth percentile) Weight(growth percentile) 136/78 (!) 53 98.5 °F (36.9 °C) (Oral) 18 5' 8\" (1.727 m) 204 lb (92.5 kg) SpO2 BMI Smoking Status 94% 31.02 kg/m2 Never Smoker Vitals History BMI and BSA Data Body Mass Index Body Surface Area 31.02 kg/m 2 2.11 m 2 Preferred Pharmacy Pharmacy Name Phone 310 [facility], [facility] 53 [address] ([address] [phone] Your Updated Medication List  
  
   
This list is accurate as of 9/7/18  4:54 PM.  Always use your most recent med list.  
  
  
  
  
 albuterol 90 mcg/actuation inhaler Commonly known as:  PROVENTIL HFA, VENTOLIN HFA, PROAIR HFA Take 2 Puffs by inhalation every six (6) hours as needed for Wheezing. cholecalciferol 1,000 unit tablet Commonly known as:  VITAMIN D3 Take 1 Tab by mouth daily. fluticasone 220 mcg/actuation inhaler Commonly known as:  FLOVENT HFA Take 1 Puff by inhalation two (2) times a day. hydrocortisone 25 mg Supp Commonly known as:  ANUSOL-HC Insert 1 Suppository into rectum two (2) times a day. Indications: Hemorrhoids MELATONIN PO Take 1 Tab by mouth as needed. multivitamin, tx-iron-ca-min 9 mg iron-400 mcg Tab tablet Commonly known as:  THERA-M w/ IRON Take 1 Tab by mouth daily. nystatin powder Commonly known as:  MYCOSTATIN Apply twice daily  
  
 nystatin-triamcinolone topical cream  
Commonly known as:  MYCOLOG II Apply  to affected area two (2) times a day. OCUVITE PO Take  by mouth. omeprazole 20 mg capsule Commonly known as:  PriLOSEC Take 1 capsule by mouth two (2) times a day. traZODone 50 mg tablet Commonly known as:  Fortino Druze Take 1 Tab by mouth nightly as needed for Sleep. Indications: insomnia associated with depression VITAMIN B-6 100 mg tablet Generic drug:  pyridoxine (vitamin B6) Take 100 mg by mouth daily. Prescriptions Sent to Pharmacy Refills  
 nystatin-triamcinolone (MYCOLOG II) topical cream 0 Sig: Apply  to affected area two (2) times a day. Class: Normal  
 [facility] [address] [facility] ([address]  Ph #: [phone] Route: Topical  
 nystatin (MYCOSTATIN) powder 5 Sig: Apply twice daily  Class: Normal  
 Pharmacy: [address] AT [address] [facility] ([address] Ph #: [phone]  
 traZODone (DESYREL) 50 mg tablet 0  
 Sig: Take 1 Tab by mouth nightly as needed for Sleep. Indications: insomnia associated with depression Class: Normal  
 Pharmacy: [facility] [address] #: [phone] Route: Oral  
  
Patient Instructions Yeast Skin Infection: Care Instructions Your Care Instructions Yeast normally lives on your skin. Sometimes too much yeast can overgrow in certain areas of the skin and cause an infection. The infection causes red, scaly, moist patches on your skin that may itch. Common areas for skin yeast infections are skin folds under the breasts or belly area. The warm and moist areas in the skin folds can make it easier for yeast to overgrow. Yeast infections also can be found on other parts of the body such as the groin or armpits. You will probably get a cream or ointment that contains an antifungal medicine. Examples of these are miconazole and clotrimazole. You put it on your skin to treat the infection. Your doctor may give you a prescription for the cream or ointment. Or you may be able to buy it without a prescription at most drugstores. If the infection is severe, the doctor will prescribe antifungal pills. A yeast infection usually goes away after about a week of treatment. But it's important to use the medicine for as long as your doctor tells you to. Follow-up care is a key part of your treatment and safety. Be sure to make and go to all appointments, and call your doctor if you are having problems. It's also a good idea to know your test results and keep a list of the medicines you take. How can you care for yourself at home? · Be safe with medicines. Take your medicines exactly as prescribed. Call your doctor if you think you are having a problem with your medicine. · Keep your skin clean and dry. Your doctor may suggest using powder that contains an antifungal medicine in the skin folds. · Wear loose clothing. When should you call for help? Call your doctor now or seek immediate medical care if: 
  · You have symptoms of infection, such as: 
¨ Increased pain, swelling, warmth, or redness. ¨ Red streaks leading from the area. ¨ Pus draining from the area. ¨ A fever.  
 Watch closely for changes in your health, and be sure to contact your doctor if: 
  · You do not get better as expected. Where can you learn more? Go to http://nuha-leandro.info/. Enter U330 in the search box to learn more about \"Yeast Skin Infection: Care Instructions. \" Current as of: October 5, 2017 Content Version: 11.7 © 8012-7833 Pathful. Care instructions adapted under license by Avedro (which disclaims liability or warranty for this information). If you have questions about a medical condition or this instruction, always ask your healthcare professional. [place] [address] any warranty or liability for your use of this information. Learning About Mood Disorders What are mood disorders? Mood disorders are medical problems that affect how you feel. They can impact your moods, thoughts, and actions. Mood disorders include: · Depression. This causes you to feel sad or hopeless for much of the time. · Bipolar disorder. This causes extreme mood changes from manic episodes of very high energy to extreme lows of depression. · Seasonal affective disorder (SAD). This is a type of depression that affects you during the same season each year. Most often people experience SAD during the fall and winter months when days are shorter and there is less light. What are the symptoms? Depression You may: · Feel sad or hopeless nearly every day. · Lose interest in or not get pleasure from most daily activities. You feel this way nearly every day. · Have low energy, changes in your appetite, or changes in how well you sleep. · Have trouble concentrating. · Think about death and suicide. Keep the numbers for these national suicide hotlines: 3-153-916-TALK (4-946.774.7217) and 5-151-UZYHJGI (6-764.824.1825). If you or someone you know talks about suicide or feeling hopeless, get help right away. Bipolar disorder Symptoms depend on your mood swings. You may: · Feel very happy, energetic, or on edge. · Feel like you need very little sleep. · Feel overly self-confident. · Do impulsive things, such as spending a lot of money. · Feel sad or hopeless. · Have racing thoughts or trouble thinking and making decisions. · Lose interest in things you have enjoyed in the past. 
· Think about death and suicide. Keep the numbers for these national suicide hotlines: 8-977-127-TALK (4-562.429.6015) and 7-536-AFFWWVN (8-472.359.4452). If you or someone you know talks about suicide or feeling hopeless, get help right away. Seasonal affective disorder (SAD) Symptoms come and go at about the same time each year. For most people with SAD, symptoms come during the winter when there is less daylight. You may: · Feel sad, grumpy, matamoros, or anxious. · Lose interest in your usual activities. · Eat more and crave carbohydrates, such as bread and pasta. · Gain weight. · Sleep more and feel drowsy during the daytime. How are mood disorders treated? Mood disorders can be treated with medicines or counseling, or a combination of both. Medicines for depression and SAD may include antidepressants. Medicines for bipolar disorder may include: · Mood stabilizers. · Antipsychotics. · Benzodiazepines. Counseling may involve cognitive-behavioral therapy. It teaches you how to change the ways you think and behave. This can help you stop thinking bad thoughts about yourself and your life. Light therapy is the main treatment for SAD. This therapy uses a special kind of lamp.  You let the lamp shine on you at certain times, usually in the morning. This may help your symptoms during the months when there is less sunlight. Healthy lifestyle Healthy lifestyle changes may help you feel better. · Get at least 30 minutes of exercise on most days of the week. Walking is a good choice. · Eat a healthy diet. Include fruits, vegetables, lean proteins, and whole grains in your diet each day. · Keep a regular sleep schedule. Try for 8 hours of sleep a night. · Find ways to manage stress, such as relaxation exercises. · Avoid alcohol and illegal drugs. Follow-up care is a key part of your treatment and safety. Be sure to make and go to all appointments, and call your doctor if you are having problems. It's also a good idea to know your test results and keep a list of the medicines you take. Where can you learn more? Go to http://nuha-leandro.info/. Enter O849 in the search box to learn more about \"Learning About Mood Disorders. \" Current as of: December 7, 2017 Content Version: 11.7 © 4763-3788 CyberFlow Analytics. Care instructions adapted under license by Measurabl (which disclaims liability or warranty for this information). If you have questions about a medical condition or this instruction, always ask your healthcare professional. [address] any warranty or liability for your use of this information. Introducing [facility]! Dear Emery Lopez: 
Thank you for requesting a NEXAGE account. Our records indicate that you already have an active NEXAGE account. You can access your account anytime at [email or web address] Did you know that you can access your hospital and ER discharge instructions at any time in NEXAGE? You can also review all of your test results from your hospital stay or ER visit. Additional Information If you have questions, please visit the Frequently Asked Questions section of the Reflex website at [email or web address] Remember, Reflex is NOT to be used for urgent needs. For medical emergencies, dial 911. Now available from your iPhone and Android! Please provide this summary of care documentation to your next provider. Your primary care clinician is listed as Anjum Rojas. If you have any questions after today's visit, please call [phone].

## 2018-09-09 NOTE — PROGRESS NOTES
HISTORY OF PRESENT ILLNESS    Presents with rash and itching of his gluteal cleft for a few week(s). Associated symptoms include: no pain. No    Treatments tried include: medication not used    Reports depressive s/s for a few months. Low energy, low motivation,apathy. Moderately severe insomnia at times. He prayed about this last week and patient overall feels his depression is gradually improving. This appt was made to discuss this, but he says she feels better  Would like help with insomnia  Denies current suicidal and homicidal plan or intent. Side effects from the treatment: none. Review of Systems   All other systems reviewed and are negative, except as noted in HPI    Past Medical and Surgical History   has a past medical history of Asthma; Basal cell carcinoma; Chronic sinus bradycardia; Current mild episode of major depressive disorder without prior episode ([facility] (9/7/2018); Generalized headaches; Insomnia; Internal hemorrhoids; Low back pain; Normal echocardiogram (1/2012); Pericarditis (2011); Plantar fasciitis of left foot; Radicular pain of right lower back (6/15/16); and Rupture of rectus femoris tendon (06/2016). has a past surgical history that includes hx heart catheterization (2011); hx colonoscopy (3/22/07); and hx orthopaedic (Left). reports that he has never smoked. He has never used smokeless tobacco. He reports that he does not drink alcohol.  family history includes Arthritis-rheumatoid in his sister; Cancer in his mother; Heart Disease in his father; SLE in his sister; Stroke in his father. Physical Exam   Nursing note and vitals reviewed. Blood pressure 136/78, pulse (!) 53, temperature 98.5 °F (36.9 °C), temperature source Oral, resp. rate 18, height 5' 8\" (1.727 m), weight 204 lb (92.5 kg), SpO2 94 %. Constitutional: In no distress. Eyes: Conjunctivae are normal.  HEENT:  No LAD or thyromegaly   Cardiovascular: Normal rate. regular rhythm.   No murmurs  No edema  Pulmonary/Chest: Effort normal. clear to ausculation blaterally  Musculoskeletal:  no edema. Abd:  Neurological: Alert and oriented. Grossly intact cranial nerves and motor function. Skin: No rash noted. Psychiatric: Normal mood and affect. Behavior is normal.     ASSESSMENT and PLAN  Diagnoses and all orders for this visit:    1. Candidal intertrigo - intertrigo - try cream, then powder prn  -     nystatin-triamcinolone (MYCOLOG II) topical cream; Apply  to affected area two (2) times a day. -     nystatin (MYCOSTATIN) powder; Apply twice daily    2. Current mild episode of major depressive disorder without prior episode (HCC)  Mild at this time. 3. Psychophysiological insomnia  Moderate. Increases risk of depression. Trial of trazozone prn  -     traZODone (DESYREL) 50 mg tablet; Take 1 Tab by mouth nightly as needed for Sleep. Indications: insomnia associated with depression      lab results and schedule of future lab studies reviewed with patient  reviewed medications and side effects in detail    Return to clinic for further evaluation if new symptoms develop or if current symptoms worsen or fail to resolve. Patient Instructions          Yeast Skin Infection: Care Instructions  Your Care Instructions    Yeast normally lives on your skin. Sometimes too much yeast can overgrow in certain areas of the skin and cause an infection. The infection causes red, scaly, moist patches on your skin that may itch. Common areas for skin yeast infections are skin folds under the breasts or belly area. The warm and moist areas in the skin folds can make it easier for yeast to overgrow. Yeast infections also can be found on other parts of the body such as the groin or armpits. You will probably get a cream or ointment that contains an antifungal medicine. Examples of these are miconazole and clotrimazole. You put it on your skin to treat the infection.  Your doctor may give you a prescription for the cream or ointment. Or you may be able to buy it without a prescription at most drugstores. If the infection is severe, the doctor will prescribe antifungal pills. A yeast infection usually goes away after about a week of treatment. But it's important to use the medicine for as long as your doctor tells you to. Follow-up care is a key part of your treatment and safety. Be sure to make and go to all appointments, and call your doctor if you are having problems. It's also a good idea to know your test results and keep a list of the medicines you take. How can you care for yourself at home? · Be safe with medicines. Take your medicines exactly as prescribed. Call your doctor if you think you are having a problem with your medicine. · Keep your skin clean and dry. Your doctor may suggest using powder that contains an antifungal medicine in the skin folds. · Wear loose clothing. When should you call for help? Call your doctor now or seek immediate medical care if:    · You have symptoms of infection, such as:  ¨ Increased pain, swelling, warmth, or redness. ¨ Red streaks leading from the area. ¨ Pus draining from the area. ¨ A fever.    Watch closely for changes in your health, and be sure to contact your doctor if:    · You do not get better as expected. Where can you learn more? Go to http://nuha-leandro.info/. Enter T431 in the search box to learn more about \"Yeast Skin Infection: Care Instructions. \"  Current as of: October 5, 2017  Content Version: 11.7  © 6341-9235 Healthwise, Incorporated. Care instructions adapted under license by BandApp (which disclaims liability or warranty for this information). If you have questions about a medical condition or this instruction, always ask your healthcare professional. [place] [address] any warranty or liability for your use of this information.        Learning About Mood Disorders  What are mood disorders? Mood disorders are medical problems that affect how you feel. They can impact your moods, thoughts, and actions. Mood disorders include:  · Depression. This causes you to feel sad or hopeless for much of the time. · Bipolar disorder. This causes extreme mood changes from manic episodes of very high energy to extreme lows of depression. · Seasonal affective disorder (SAD). This is a type of depression that affects you during the same season each year. Most often people experience SAD during the fall and winter months when days are shorter and there is less light. What are the symptoms? Depression  You may:  · Feel sad or hopeless nearly every day. · Lose interest in or not get pleasure from most daily activities. You feel this way nearly every day. · Have low energy, changes in your appetite, or changes in how well you sleep. · Have trouble concentrating. · Think about death and suicide. Keep the numbers for these national suicide hotlines: 2-806-616-TALK (0-562.988.1862) and 6-656-BVFLLVG (1-997.152.9155). If you or someone you know talks about suicide or feeling hopeless, get help right away. Bipolar disorder  Symptoms depend on your mood swings. You may:  · Feel very happy, energetic, or on edge. · Feel like you need very little sleep. · Feel overly self-confident. · Do impulsive things, such as spending a lot of money. · Feel sad or hopeless. · Have racing thoughts or trouble thinking and making decisions. · Lose interest in things you have enjoyed in the past.  · Think about death and suicide. Keep the numbers for these national suicide hotlines: 0-540-771-TALK (1-190.418.3475) and 4-434-KXMNXJD (7-249.829.9993). If you or someone you know talks about suicide or feeling hopeless, get help right away. Seasonal affective disorder (SAD)  Symptoms come and go at about the same time each year. For most people with SAD, symptoms come during the winter when there is less daylight.  You may:  · Feel sad, grumpy, matamoros, or anxious. · Lose interest in your usual activities. · Eat more and crave carbohydrates, such as bread and pasta. · Gain weight. · Sleep more and feel drowsy during the daytime. How are mood disorders treated? Mood disorders can be treated with medicines or counseling, or a combination of both. Medicines for depression and SAD may include antidepressants. Medicines for bipolar disorder may include:  · Mood stabilizers. · Antipsychotics. · Benzodiazepines. Counseling may involve cognitive-behavioral therapy. It teaches you how to change the ways you think and behave. This can help you stop thinking bad thoughts about yourself and your life. Light therapy is the main treatment for SAD. This therapy uses a special kind of lamp. You let the lamp shine on you at certain times, usually in the morning. This may help your symptoms during the months when there is less sunlight. Healthy lifestyle  Healthy lifestyle changes may help you feel better. · Get at least 30 minutes of exercise on most days of the week. Walking is a good choice. · Eat a healthy diet. Include fruits, vegetables, lean proteins, and whole grains in your diet each day. · Keep a regular sleep schedule. Try for 8 hours of sleep a night. · Find ways to manage stress, such as relaxation exercises. · Avoid alcohol and illegal drugs. Follow-up care is a key part of your treatment and safety. Be sure to make and go to all appointments, and call your doctor if you are having problems. It's also a good idea to know your test results and keep a list of the medicines you take. Where can you learn more? Go to http://nuha-leandro.info/. Enter E107 in the search box to learn more about \"Learning About Mood Disorders. \"  Current as of: December 7, 2017  Content Version: 11.7  © 4141-1795 [employer]cursor Ener[employer], [employer].  Care instructions adapted under license by Atlas Guides (which disclaims liability or warranty for this information). If you have questions about a medical condition or this instruction, always ask your healthcare professional. [place] [address] any warranty or liability for your use of this information.

## 2019-01-03 ENCOUNTER — OFFICE VISIT (OUTPATIENT)
Dept: INTERNAL MEDICINE CLINIC | Age: 64
End: 2019-01-03

## 2019-01-03 VITALS
SYSTOLIC BLOOD PRESSURE: 113 MMHG | HEART RATE: 56 BPM | DIASTOLIC BLOOD PRESSURE: 75 MMHG | RESPIRATION RATE: 16 BRPM | BODY MASS INDEX: 30.92 KG/M2 | WEIGHT: 204 LBS | HEIGHT: 68 IN | TEMPERATURE: 98.1 F | OXYGEN SATURATION: 99 %

## 2019-01-03 DIAGNOSIS — E55.9 VITAMIN D INSUFFICIENCY: ICD-10-CM

## 2019-01-03 DIAGNOSIS — Z00.00 WELL ADULT HEALTH CHECK: Primary | ICD-10-CM

## 2019-01-03 NOTE — PROGRESS NOTES
Mitra Dent is a 59 y.o. male Presenting for his annual checkup and health maintenance review and follow-up Sleeping better with melatonin. Took trazodone once and it helped, but does not want to take medications. .  Denies current depression. Exercise: moderately active Does a lot of yard work. Diet: generally follows a low fat low cholesterol diet Health Maintenance Topic Date Due  Shingrix Vaccine Age 50> (1 of 2) 09/18/2006  DTaP/Tdap/Td series (3 - Td) 02/15/2026  COLONOSCOPY  09/22/2027  Hepatitis C Screening  Completed  Influenza Age 5 to Adult  Completed  Pneumococcal 19-64 Medium Risk  Addressed Health Maintenance reviewed Last digital rectal exam:  4/2017 Lab Results Component Value Date/Time  
 Prostate Specific Ag 0.2 04/11/2017 08:55 AM  
 Prostate Specific Ag 0.2 02/15/2016 10:05 AM  
 Prostate Specific Ag 0.2 09/16/2013 03:01 PM  
 
 
Vaccinations reviewed Immunization History Administered Date(s) Administered  Influenza Vaccine 09/16/2013, 10/09/2018  TDAP Vaccine 01/16/2005  Tdap 05/07/2014, 02/15/2016  Zoster Vaccine, Live 01/02/2017 Past Medical History:  
Diagnosis Date  Asthma   
 mild intermittent  Basal cell carcinoma   
 face, chest?  Dr. Henrique Salinas  Chronic sinus bradycardia  Current mild episode of major depressive disorder without prior episode ([facility] [address] 9/7/2018  Generalized headaches 2008 MRI normal  
 Insomnia   
 normal sleep study 2008  Internal hemorrhoids  Low back pain   
 recurrent, sometimes w L leg numbness  Normal echocardiogram 1/2012  Pericarditis 2011  
 idiopathic  Plantar fasciitis of left foot Dr. Kayla Dutton. tear 1/14/14. MRI 3/2014 showed tear  Radicular pain of right lower back 6/15/16  Rupture of rectus femoris tendon 06/2016  MRI 7/18/16  
 
 has a past surgical history that includes hx heart catheterization (2011); hx colonoscopy (3/22/07); and hx orthopaedic (Left). Percocet [oxycodone-acetaminophen] and Sulfa (sulfonamide antibiotics) Current Outpatient Medications Medication Sig  MELATONIN PO Take 1 Tab by mouth as needed.  pyridoxine, vitamin B6, (VITAMIN B-6) 100 mg tablet Take 100 mg by mouth daily.  multivitamin, tx-iron-ca-min (THERA-M W/ IRON) 9 mg iron-400 mcg tab tablet Take 1 Tab by mouth daily.  cholecalciferol (VITAMIN D3) 1,000 unit tablet Take 1 Tab by mouth daily. No current facility-administered medications for this visit. SOCIAL HX:  reports that  has never smoked. he has never used smokeless tobacco. He reports that he does not drink alcohol. FAMILY HX: family history includes Arthritis-rheumatoid in his sister; Cancer in his mother; Heart Disease in his father; SLE in his sister; Stroke in his father. Review of Systems - History obtained from the patient General ROS: negative for - night sweats, weight gain or weight loss Cardiovascular ROS: no chest pain, dyspnea on exertion, edema Physical exam 
Blood pressure 113/75, pulse (!) 56, temperature 98.1 °F (36.7 °C), temperature source Oral, resp. rate 16, height 5' 8\" (1.727 m), weight 204 lb (92.5 kg), SpO2 99 %. Wt Readings from Last 3 Encounters:  
01/03/19 204 lb (92.5 kg) 09/07/18 204 lb (92.5 kg) 05/02/18 199 lb 3.2 oz (90.4 kg) He appears well, alert and oriented x 3, pleasant and cooperative. Vitals as noted. No rashes or significant lesions. Neck supple and free of adenopathy, or masses. No thyromegaly or carotid bruits. Cranial nerves normal. Lungs are clear to auscultation. Heart sounds are normal with no murmurs, clicks, gallops or rubs. Abdomen is soft, non- tender, with no masses or organomegaly. Extremities, peripheral pulses and reflexes are normal.  . RECTAL/PROSTATE EXAM: smooth and symmetric without nodules or tenderness. Skin is without rashes or suspicious lesions. Diagnoses and all orders for this visit: 
 
1. Well adult health check Appears healthy Recommend Shingrix -     PSA W/ REFLX FREE PSA 
-     METABOLIC PANEL, COMPREHENSIVE 
-     LIPID PANEL 
-     CBC WITH AUTOMATED DIFF 2. Vitamin D insufficiency 
-     VITAMIN D, 25 HYDROXY The patient is asked to make an attempt to improve diet and exercise patterns Avoid tobacco products, excessive alcohol Return for yearly wellness visits Discussed the patient's BMI with him. The BMI follow up plan is as follows:  
 
dietary management education, guidance, and counseling 
encourage exercise 
monitor weight 
prescribed dietary intake An After Visit Summary was printed and given to the patient.

## 2019-01-03 NOTE — PATIENT INSTRUCTIONS
Body Mass Index: Care Instructions Your Care Instructions Body mass index (BMI) can help you see if your weight is raising your risk for health problems. It uses a formula to compare how much you weigh with how tall you are. · A BMI lower than 18.5 is considered underweight. · A BMI between 18.5 and 24.9 is considered healthy. · A BMI between 25 and 29.9 is considered overweight. A BMI of 30 or higher is considered obese. If your BMI is in the normal range, it means that you have a lower risk for weight-related health problems. If your BMI is in the overweight or obese range, you may be at increased risk for weight-related health problems, such as high blood pressure, heart disease, stroke, arthritis or joint pain, and diabetes. If your BMI is in the underweight range, you may be at increased risk for health problems such as fatigue, lower protection (immunity) against illness, muscle loss, bone loss, hair loss, and hormone problems. BMI is just one measure of your risk for weight-related health problems. You may be at higher risk for health problems if you are not active, you eat an unhealthy diet, or you drink too much alcohol or use tobacco products. Follow-up care is a key part of your treatment and safety. Be sure to make and go to all appointments, and call your doctor if you are having problems. It's also a good idea to know your test results and keep a list of the medicines you take. How can you care for yourself at home? · Practice healthy eating habits. This includes eating plenty of fruits, vegetables, whole grains, lean protein, and low-fat dairy. · If your doctor recommends it, get more exercise. Walking is a good choice. Bit by bit, increase the amount you walk every day. Try for at least 30 minutes on most days of the week. · Do not smoke. Smoking can increase your risk for health problems.  If you need help quitting, talk to your doctor about stop-smoking programs and medicines. These can increase your chances of quitting for good. · Limit alcohol to 2 drinks a day for men and 1 drink a day for women. Too much alcohol can cause health problems. If you have a BMI higher than 25 · Your doctor may do other tests to check your risk for weight-related health problems. This may include measuring the distance around your waist. A waist measurement of more than 40 inches in men or 35 inches in women can increase the risk of weight-related health problems. · Talk with your doctor about steps you can take to stay healthy or improve your health. You may need to make lifestyle changes to lose weight and stay healthy, such as changing your diet and getting regular exercise. If you have a BMI lower than 18.5 · Your doctor may do other tests to check your risk for health problems. · Talk with your doctor about steps you can take to stay healthy or improve your health. You may need to make lifestyle changes to gain or maintain weight and stay healthy, such as getting more healthy foods in your diet and doing exercises to build muscle. Where can you learn more? Go to http://nuha-leandro.info/. Enter S176 in the search box to learn more about \"Body Mass Index: Care Instructions. \" Current as of: October 13, 2016 Content Version: 11.4 © 9172-9807 [employer]. Care instructions adapted under license by [employer] (which disclaims liability or warranty for this information). If you have questions about a medical condition or this instruction, always ask your healthcare professional. [address] any warranty or liability for your use of this information.

## 2019-01-12 LAB
25(OH)D3+25(OH)D2 SERPL-MCNC: 37 NG/ML (ref 30–100)
ALBUMIN SERPL-MCNC: 4.7 G/DL (ref 3.6–4.8)
ALBUMIN/GLOB SERPL: 1.5 {RATIO} (ref 1.2–2.2)
ALP SERPL-CCNC: 65 IU/L (ref 39–117)
ALT SERPL-CCNC: 26 IU/L (ref 0–44)
AST SERPL-CCNC: 20 IU/L (ref 0–40)
BASOPHILS # BLD AUTO: 0 X10E3/UL (ref 0–0.2)
BASOPHILS NFR BLD AUTO: 1 %
BILIRUB SERPL-MCNC: 0.5 MG/DL (ref 0–1.2)
BUN SERPL-MCNC: 15 MG/DL (ref 8–27)
BUN/CREAT SERPL: 14 (ref 10–24)
CALCIUM SERPL-MCNC: 9.3 MG/DL (ref 8.6–10.2)
CHLORIDE SERPL-SCNC: 101 MMOL/L (ref 96–106)
CHOLEST SERPL-MCNC: 183 MG/DL (ref 100–199)
CO2 SERPL-SCNC: 26 MMOL/L (ref 20–29)
CREAT SERPL-MCNC: 1.06 MG/DL (ref 0.76–1.27)
EOSINOPHIL # BLD AUTO: 0.1 X10E3/UL (ref 0–0.4)
EOSINOPHIL NFR BLD AUTO: 1 %
ERYTHROCYTE [DISTWIDTH] IN BLOOD BY AUTOMATED COUNT: 12.9 % (ref 12.3–15.4)
GLOBULIN SER CALC-MCNC: 3.1 G/DL (ref 1.5–4.5)
GLUCOSE SERPL-MCNC: 109 MG/DL (ref 65–99)
HCT VFR BLD AUTO: 48.6 % (ref 37.5–51)
HDLC SERPL-MCNC: 59 MG/DL
HGB BLD-MCNC: 16.9 G/DL (ref 13–17.7)
IMM GRANULOCYTES # BLD AUTO: 0 X10E3/UL (ref 0–0.1)
IMM GRANULOCYTES NFR BLD AUTO: 0 %
INTERPRETATION, 910389: NORMAL
LDLC SERPL CALC-MCNC: 111 MG/DL (ref 0–99)
LYMPHOCYTES # BLD AUTO: 1.9 X10E3/UL (ref 0.7–3.1)
LYMPHOCYTES NFR BLD AUTO: 35 %
MCH RBC QN AUTO: 31.8 PG (ref 26.6–33)
MCHC RBC AUTO-ENTMCNC: 34.8 G/DL (ref 31.5–35.7)
MCV RBC AUTO: 91 FL (ref 79–97)
MONOCYTES # BLD AUTO: 0.4 X10E3/UL (ref 0.1–0.9)
MONOCYTES NFR BLD AUTO: 7 %
NEUTROPHILS # BLD AUTO: 2.9 X10E3/UL (ref 1.4–7)
NEUTROPHILS NFR BLD AUTO: 56 %
PLATELET # BLD AUTO: 220 X10E3/UL (ref 150–379)
POTASSIUM SERPL-SCNC: 4.6 MMOL/L (ref 3.5–5.2)
PROT SERPL-MCNC: 7.8 G/DL (ref 6–8.5)
PSA SERPL-MCNC: 0.3 NG/ML (ref 0–4)
RBC # BLD AUTO: 5.32 X10E6/UL (ref 4.14–5.8)
REFLEX CRITERIA: NORMAL
SODIUM SERPL-SCNC: 142 MMOL/L (ref 134–144)
TRIGL SERPL-MCNC: 67 MG/DL (ref 0–149)
VLDLC SERPL CALC-MCNC: 13 MG/DL (ref 5–40)
WBC # BLD AUTO: 5.3 X10E3/UL (ref 3.4–10.8)

## 2019-02-18 RX ORDER — FLUTICASONE PROPIONATE 220 UG/1
1 AEROSOL, METERED RESPIRATORY (INHALATION) 2 TIMES DAILY
Qty: 1 INHALER | Refills: 3 | Status: SHIPPED | OUTPATIENT
Start: 2019-02-18 | End: 2020-10-06 | Stop reason: SDUPTHER

## 2019-02-18 RX ORDER — ALBUTEROL SULFATE 90 UG/1
2 AEROSOL, METERED RESPIRATORY (INHALATION)
Qty: 1 INHALER | Refills: 5 | Status: SHIPPED | OUTPATIENT
Start: 2019-02-18 | End: 2020-10-06 | Stop reason: SDUPTHER

## 2019-05-17 ENCOUNTER — OFFICE VISIT (OUTPATIENT)
Dept: INTERNAL MEDICINE CLINIC | Age: 64
End: 2019-05-17

## 2019-05-17 VITALS
HEART RATE: 46 BPM | TEMPERATURE: 98.4 F | HEIGHT: 68 IN | SYSTOLIC BLOOD PRESSURE: 119 MMHG | OXYGEN SATURATION: 97 % | WEIGHT: 200.25 LBS | DIASTOLIC BLOOD PRESSURE: 68 MMHG | RESPIRATION RATE: 18 BRPM | BODY MASS INDEX: 30.35 KG/M2

## 2019-05-17 DIAGNOSIS — H61.21 HEARING LOSS OF RIGHT EAR DUE TO CERUMEN IMPACTION: Primary | ICD-10-CM

## 2019-05-17 PROBLEM — F32.0 CURRENT MILD EPISODE OF MAJOR DEPRESSIVE DISORDER WITHOUT PRIOR EPISODE (HCC): Status: RESOLVED | Noted: 2018-09-07 | Resolved: 2019-05-17

## 2019-05-18 NOTE — PROGRESS NOTES
HISTORY OF PRESENT ILLNESS    Presents with intermittent muffled hearing and fullness of right ear for 1 month(s). Associated symptoms include: no otalgia   Treatments tried include: medication not used    Review of Systems   All other systems reviewed and are negative, except as noted in HPI    Past Medical and Surgical History   has a past medical history of Asthma, Basal cell carcinoma, Chronic sinus bradycardia, Current mild episode of major depressive disorder without prior episode ([facility] [address] (9/7/2018), Generalized headaches, Insomnia, Internal hemorrhoids, Low back pain, Normal echocardiogram (1/2012), Pericarditis (2011), Plantar fasciitis of left foot, Radicular pain of right lower back (6/15/16), and Rupture of rectus femoris tendon (06/2016). has a past surgical history that includes hx heart catheterization (2011); hx colonoscopy (3/22/07); and hx orthopaedic (Left). reports that he has never smoked. He has never used smokeless tobacco. He reports that he does not drink alcohol.  family history includes Arthritis-rheumatoid in his sister; Cancer in his mother; Heart Disease in his father; SLE in his sister; Stroke in his father. Physical Exam   Nursing note and vitals reviewed. Blood pressure 119/68, pulse (!) 46, temperature 98.4 °F (36.9 °C), temperature source Oral, resp. rate 18, height 5' 8\" (1.727 m), weight 200 lb 4 oz (90.8 kg), SpO2 97 %. Constitutional: In no distress. Eyes: Conjunctivae are normal.  HEENT:  No LAD or thyromegaly   Right ear canal w cerumen impaction. Left canal 1/2 impacted  Cardiovascular: Normal rate. regular rhythm. No murmurs  No edema  Pulmonary/Chest: Effort normal. clear to ausculation blaterally  Musculoskeletal:  no edema. Abd:  Neurological: Alert and oriented. Grossly intact cranial nerves and motor function. Skin: No rash noted. Psychiatric: Normal mood and affect.  Behavior is normal.     ASSESSMENT and PLAN  Diagnoses and all orders for this visit: 1. Hearing loss of right ear due to cerumen impaction  -     REMOVE IMPACTED EAR WAX    Ceruminosis is noted. Wax is removed by syringing and manual debridement. Instructions for home care to prevent wax buildup are given. lab results and schedule of future lab studies reviewed with patient  reviewed medications and side effects in detail    Return to clinic for further evaluation if new symptoms develop or if current symptoms worsen or fail to resolve. There are no Patient Instructions on file for this visit.

## 2019-06-27 DIAGNOSIS — Z11.59 SCREENING EXAMINATION FOR MEASLES: Primary | ICD-10-CM

## 2019-07-03 DIAGNOSIS — Z00.00 WELL ADULT EXAM: ICD-10-CM

## 2019-07-03 DIAGNOSIS — B37.2 CANDIDAL INTERTRIGO: ICD-10-CM

## 2019-07-03 RX ORDER — HYDROCORTISONE ACETATE 25 MG/1
25 SUPPOSITORY RECTAL 2 TIMES DAILY
Qty: 24 SUPPOSITORY | Refills: 1 | Status: SHIPPED | OUTPATIENT
Start: 2019-07-03 | End: 2020-03-17 | Stop reason: ALTCHOICE

## 2019-07-03 RX ORDER — NYSTATIN AND TRIAMCINOLONE ACETONIDE 100000; 1 [USP'U]/G; MG/G
CREAM TOPICAL 2 TIMES DAILY
Qty: 30 G | Refills: 0 | Status: SHIPPED | OUTPATIENT
Start: 2019-07-03 | End: 2020-03-17 | Stop reason: ALTCHOICE

## 2019-07-06 LAB
MEV IGG SER IA-ACNC: >300 AU/ML
MUV IGG SER IA-ACNC: 204 AU/ML
RUBV IGG SERPL IA-ACNC: 25.2 INDEX

## 2019-11-11 ENCOUNTER — OFFICE VISIT (OUTPATIENT)
Dept: INTERNAL MEDICINE CLINIC | Age: 64
End: 2019-11-11

## 2019-11-11 ENCOUNTER — HOSPITAL ENCOUNTER (OUTPATIENT)
Dept: GENERAL RADIOLOGY | Age: 64
Discharge: HOME | End: 2019-11-11
Attending: NURSE PRACTITIONER
Payer: COMMERCIAL

## 2019-11-11 DIAGNOSIS — M25.561 RIGHT MEDIAL KNEE PAIN: Primary | ICD-10-CM

## 2019-11-11 DIAGNOSIS — M25.561 CHRONIC PAIN OF RIGHT KNEE: ICD-10-CM

## 2019-11-11 DIAGNOSIS — G89.29 CHRONIC PAIN OF RIGHT KNEE: ICD-10-CM

## 2019-11-11 PROCEDURE — 73562 X-RAY EXAM OF KNEE 3: CPT

## 2019-11-11 RX ORDER — DICLOFENAC SODIUM 75 MG/1
75 TABLET, DELAYED RELEASE ORAL 2 TIMES DAILY
Qty: 40 TAB | Refills: 0 | Status: SHIPPED | OUTPATIENT
Start: 2019-11-11 | End: 2020-03-04 | Stop reason: ALTCHOICE

## 2019-11-11 RX ORDER — OMEPRAZOLE 20 MG/1
20 TABLET, DELAYED RELEASE ORAL DAILY
COMMUNITY
End: 2022-08-05 | Stop reason: ALTCHOICE

## 2019-11-11 NOTE — PATIENT INSTRUCTIONS
Knee Pain or Injury: Care Instructions  Your Care Instructions    Injuries are a common cause of knee problems. Sudden (acute) injuries may be caused by a direct blow to the knee. They can also be caused by abnormal twisting, bending, or falling on the knee. Pain, bruising, or swelling may be severe, and may start within minutes of the injury. Overuse is another cause of knee pain. Other causes are climbing stairs, kneeling, and other activities that use the knee. Everyday wear and tear, especially as you get older, also can cause knee pain. Rest, along with home treatment, often relieves pain and allows your knee to heal. If you have a serious knee injury, you may need tests and treatment. Follow-up care is a key part of your treatment and safety. Be sure to make and go to all appointments, and call your doctor if you are having problems. It's also a good idea to know your test results and keep a list of the medicines you take. How can you care for yourself at home? · Be safe with medicines. Read and follow all instructions on the label. ? If the doctor gave you a prescription medicine for pain, take it as prescribed. ? If you are not taking a prescription pain medicine, ask your doctor if you can take an over-the-counter medicine. · Rest and protect your knee. Take a break from any activity that may cause pain. · Put ice or a cold pack on your knee for 10 to 20 minutes at a time. Put a thin cloth between the ice and your skin. · Prop up a sore knee on a pillow when you ice it or anytime you sit or lie down for the next 3 days. Try to keep it above the level of your heart. This will help reduce swelling. · If your knee is not swollen, you can put moist heat, a heating pad, or a warm cloth on your knee. · If your doctor recommends an elastic bandage, sleeve, or other type of support for your knee, wear it as directed.   · Follow your doctor's instructions about how much weight you can put on your leg. Use a cane, crutches, or a walker as instructed. · Follow your doctor's instructions about activity during your healing process. If you can do mild exercise, slowly increase your activity. · Reach and stay at a healthy weight. Extra weight can strain the joints, especially the knees and hips, and make the pain worse. Losing even a few pounds may help. When should you call for help? Call 911 anytime you think you may need emergency care. For example, call if:    · You have symptoms of a blood clot in your lung (called a pulmonary embolism). These may include:  ? Sudden chest pain. ? Trouble breathing. ? Coughing up blood.    Call your doctor now or seek immediate medical care if:    · You have severe or increasing pain.     · Your leg or foot turns cold or changes color.     · You cannot stand or put weight on your knee.     · Your knee looks twisted or bent out of shape.     · You cannot move your knee.     · You have signs of infection, such as:  ? Increased pain, swelling, warmth, or redness. ? Red streaks leading from the knee. ? Pus draining from a place on your knee. ? A fever.     · You have signs of a blood clot in your leg (called a deep vein thrombosis), such as:  ? Pain in your calf, back of the knee, thigh, or groin. ? Redness and swelling in your leg or groin.    Watch closely for changes in your health, and be sure to contact your doctor if:    · You have tingling, weakness, or numbness in your knee.     · You have any new symptoms, such as swelling.     · You have bruises from a knee injury that last longer than 2 weeks.     · You do not get better as expected. Where can you learn more? Go to http://nuha-leandro.info/. Enter K195 in the search box to learn more about \"Knee Pain or Injury: Care Instructions. \"  Current as of: June 26, 2019  Content Version: 12.2  © 3526-0872 [employer].  Care instructions adapted under license by [employer] (which disclaims liability or warranty for this information). If you have questions about a medical condition or this instruction, always ask your healthcare professional. [address] any warranty or liability for your use of this information. Knee: Exercises  Introduction  Here are some examples of exercises for you to try. The exercises may be suggested for a condition or for rehabilitation. Start each exercise slowly. Ease off the exercises if you start to have pain. You will be told when to start these exercises and which ones will work best for you. How to do the exercises  Quad sets    1. Sit with your leg straight and supported on the floor or a firm bed. (If you feel discomfort in the front or back of your knee, place a small towel roll under your knee.)  2. Tighten the muscles on top of your thigh by pressing the back of your knee flat down to the floor. (If you feel discomfort under your kneecap, place a small towel roll under your knee.)  3. Hold for about 6 seconds, then rest for up to 10 seconds. 4. Do 8 to 12 repetitions several times a day. Straight-leg raises to the front    1. Lie on your back with your good knee bent so that your foot rests flat on the floor. Your injured leg should be straight. Make sure that your low back has a normal curve. You should be able to slip your flat hand in between the floor and the small of your back, with your palm touching the floor and your back touching the back of your hand. 2. Tighten the thigh muscles in the injured leg by pressing the back of your knee flat down to the floor. Hold your knee straight. 3. Keeping the thigh muscles tight, lift your injured leg up so that your heel is about 12 inches off the floor. Hold for about 6 seconds and then lower slowly. 4. Do 8 to 12 repetitions, 3 times a day. Straight-leg raises to the outside    1. Lie on your side, with your injured leg on top.   2. Tighten the front thigh muscles of your injured leg to keep your knee straight. 3. Keep your hip and your leg straight in line with the rest of your body, and keep your knee pointing forward. Do not drop your hip back. 4. Lift your injured leg straight up toward the ceiling, about 12 inches off the floor. Hold for about 6 seconds, then slowly lower your leg. 5. Do 8 to 12 repetitions. Straight-leg raises to the back    1. Lie on your stomach, and lift your leg straight up behind you (toward the ceiling). 2. Lift your toes about 6 inches off the floor, hold for about 6 seconds, then lower slowly. 3. Do 8 to 12 repetitions. Straight-leg raises to the inside    1. Lie on the side of your body with the injured leg. 2. You can either prop your other (good) leg up on a chair, or you can bend your good knee and put that foot in front of your injured knee. Do not drop your hip back. 3. Tighten the muscles on the front of your thigh to straighten your injured knee. 4. Keep your kneecap pointing forward, and lift your whole leg up toward the ceiling about 6 inches. Hold for about 6 seconds, then lower slowly. 5. Do 8 to 12 repetitions. Heel dig bridging    1. Lie on your back with both knees bent and your ankles bent so that only your heels are digging into the floor. Your knees should be bent about 90 degrees. 2. Then push your heels into the floor, squeeze your buttocks, and lift your hips off the floor until your shoulders, hips, and knees are all in a straight line. 3. Hold for about 6 seconds as you continue to breathe normally, and then slowly lower your hips back down to the floor and rest for up to 10 seconds. 4. Do 8 to 12 repetitions. Hamstring curls    1. Lie on your stomach with your knees straight. If your kneecap is uncomfortable, roll up a washcloth and put it under your leg just above your kneecap. 2. Lift the foot of your injured leg by bending the knee so that you bring the foot up toward your buttock.  If this motion hurts, try it without bending your knee quite as far. This may help you avoid any painful motion. 3. Slowly lower your leg back to the floor. 4. Do 8 to 12 repetitions. 5. With permission from your doctor or physical therapist, you may also want to add a cuff weight to your ankle (not more than 5 pounds). With weight, you do not have to lift your leg more than 12 inches to get a hamstring workout. Shallow standing knee bends    1. Stand with your hands lightly resting on a counter or chair in front of you. Put your feet shoulder-width apart. 2. Slowly bend your knees so that you squat down like you are going to sit in a chair. Make sure your knees do not go in front of your toes. 3. Lower yourself about 6 inches. Your heels should remain on the floor at all times. 4. Rise slowly to a standing position. Heel raises    1. Stand with your feet a few inches apart, with your hands lightly resting on a counter or chair in front of you. 2. Slowly raise your heels off the floor while keeping your knees straight. 3. Hold for about 6 seconds, then slowly lower your heels to the floor. 4. Do 8 to 12 repetitions several times during the day. Follow-up care is a key part of your treatment and safety. Be sure to make and go to all appointments, and call your doctor if you are having problems. It's also a good idea to know your test results and keep a list of the medicines you take. Where can you learn more? Go to http://nuha-leandro.info/. Enter M743 in the search box to learn more about \"Knee: Exercises. \"  Current as of: June 26, 2019  Content Version: 12.2  © 9148-8546 [employer]. Care instructions adapted under license by [employer] (which disclaims liability or warranty for this information).  If you have questions about a medical condition or this instruction, always ask your healthcare professional. Anders Chiu disclaims any warranty or liability for your use of this information.

## 2019-11-12 VITALS
DIASTOLIC BLOOD PRESSURE: 80 MMHG | SYSTOLIC BLOOD PRESSURE: 136 MMHG | HEIGHT: 68 IN | RESPIRATION RATE: 12 BRPM | WEIGHT: 201.2 LBS | BODY MASS INDEX: 30.49 KG/M2 | HEART RATE: 47 BPM | TEMPERATURE: 97.8 F | OXYGEN SATURATION: 96 %

## 2019-11-12 NOTE — PROGRESS NOTES
HISTORY OF PRESENT ILLNESS  Sabrina Vivas is a 62 y.o. male. HPI  Presents with complaints of right medial knee pain for the past several weeks but noted firm knot to medial aspect 2 nights ago; pressure over knot causes tenderness. Denies any specific injury and has not been doing any different exercises than his usual use of elliptical machine and swimming. Has taken some Ibuprofen OTC with minimal improvement. Denies weakness of right knee and has not noted any buckling. Review of Systems   Constitutional: Negative for chills and fever. HENT: Negative for congestion and sore throat. Respiratory: Negative for cough and shortness of breath. Cardiovascular: Negative for chest pain and palpitations. Gastrointestinal: Negative for nausea and vomiting. Musculoskeletal: Positive for joint pain (right knee). Neurological: Negative for dizziness, tingling and headaches. /80 (BP 1 Location: Left arm, BP Patient Position: Sitting)   Pulse (!) 47   Temp 97.8 °F (36.6 °C) (Oral)   Resp 12   Ht 5' 8\" (1.727 m)   Wt 201 lb 3.2 oz (91.3 kg)   SpO2 96%   BMI 30.59 kg/m²   Physical Exam   Constitutional: He is oriented to person, place, and time. He appears well-developed and well-nourished. HENT:   Head: Normocephalic and atraumatic. Neck: Normal range of motion. Neck supple. Cardiovascular: Normal rate and regular rhythm. Pulmonary/Chest: Effort normal and breath sounds normal.   Musculoskeletal:        Right knee: He exhibits swelling. He exhibits normal range of motion and no erythema. Tenderness found. MCL tenderness noted. Legs:  Neurological: He is alert and oriented to person, place, and time. Psychiatric: He has a normal mood and affect. His behavior is normal.   Nursing note and vitals reviewed. ASSESSMENT and PLAN  Diagnoses and all orders for this visit:    1.  Right medial knee pain -- possible MCL strain; will treat with NSAIDs; if symptoms not improving, will refer to Orthopedics. -     diclofenac EC (VOLTAREN) 75 mg EC tablet; Take 1 Tab by mouth two (2) times a day.   -     XR KNEE RT 3 V; Future      lab results and schedule of future lab studies reviewed with patient  reviewed diet, exercise and weight control  reviewed medications and side effects in detail  radiology results and schedule of future radiology studies reviewed with patient

## 2020-02-14 ENCOUNTER — TELEPHONE (OUTPATIENT)
Dept: INTERNAL MEDICINE CLINIC | Age: 65
End: 2020-02-14

## 2020-02-14 ENCOUNTER — HOSPITAL ENCOUNTER (EMERGENCY)
Age: 65
Discharge: HOME | End: 2020-02-14
Attending: EMERGENCY MEDICINE
Payer: COMMERCIAL

## 2020-02-14 ENCOUNTER — APPOINTMENT (OUTPATIENT)
Dept: GENERAL RADIOLOGY | Age: 65
End: 2020-02-14
Attending: EMERGENCY MEDICINE
Payer: COMMERCIAL

## 2020-02-14 VITALS
HEART RATE: 73 BPM | WEIGHT: 197.97 LBS | DIASTOLIC BLOOD PRESSURE: 80 MMHG | SYSTOLIC BLOOD PRESSURE: 174 MMHG | RESPIRATION RATE: 18 BRPM | OXYGEN SATURATION: 98 % | TEMPERATURE: 99.9 F | BODY MASS INDEX: 30.1 KG/M2

## 2020-02-14 DIAGNOSIS — J06.9 UPPER RESPIRATORY TRACT INFECTION, UNSPECIFIED TYPE: Primary | ICD-10-CM

## 2020-02-14 PROCEDURE — 71046 X-RAY EXAM CHEST 2 VIEWS: CPT

## 2020-02-14 PROCEDURE — 74011250637 HC RX REV CODE- 250/637: Performed by: EMERGENCY MEDICINE

## 2020-02-14 PROCEDURE — 74011636637 HC RX REV CODE- 636/637: Performed by: EMERGENCY MEDICINE

## 2020-02-14 PROCEDURE — 99283 EMERGENCY DEPT VISIT LOW MDM: CPT

## 2020-02-14 RX ORDER — BENZONATATE 100 MG/1
200 CAPSULE ORAL
Qty: 30 CAP | Refills: 0 | Status: SHIPPED | OUTPATIENT
Start: 2020-02-14 | End: 2020-02-21

## 2020-02-14 RX ORDER — PREDNISONE 50 MG/1
50 TABLET ORAL DAILY
Qty: 4 TAB | Refills: 0 | Status: SHIPPED | OUTPATIENT
Start: 2020-02-14 | End: 2020-02-18

## 2020-02-14 RX ORDER — BENZONATATE 100 MG/1
200 CAPSULE ORAL
Status: DISCONTINUED | OUTPATIENT
Start: 2020-02-14 | End: 2020-02-14 | Stop reason: HOSPADM

## 2020-02-14 RX ADMIN — BENZONATATE 200 MG: 100 CAPSULE ORAL at 08:48

## 2020-02-14 RX ADMIN — PREDNISONE 50 MG: 5 TABLET ORAL at 08:48

## 2020-02-14 NOTE — TELEPHONE ENCOUNTER
Pt and wife walked in this morning , no appointment, wearing masks, told PSR [place] had been [place] and both were sick, had multiple concerns due to Virus, since they were on the Plane and was at [employer] , Birds were found in a suitcase of a passenger, pt states he wants this documented due to Respiratooory issues and he was c/o of chest tightness , anxious, NP advised pt to ER for evaulation , pt went with wife. PCP was advised .

## 2020-02-14 NOTE — ED NOTES
Patient  discharged with instructions per Dr Reanna Donahue. Instructions reviewed with pt. Instructions reviewed with pt.

## 2020-02-14 NOTE — ED PROVIDER NOTES
Date of Service:  2/14/2020    Patient:  Kali Cabrales    Chief Complaint:  Cough and Anxiety       HPI:  Kali Cabrales is a 62 y.o.  male who presents for evaluation of cough and congestion. Patient had a recent trip to [facility]. Approximately senior living through his trip he started not feeling well with cough and congestion. Subjective fever but nothing documented. He has some sinus pressure with some drainage and cough. He has had some intermittent wheezing and shortness of breath but does not have those symptoms right now. No nausea vomiting diarrhea headaches chills body aches. Patient otherwise denies any other acute complaints           Past Medical History:   Diagnosis Date    Asthma     mild intermittent    Basal cell carcinoma     face, chest? Dr Aquiles Paez. saw Dr. Ta Doan Chronic sinus bradycardia     [facility] of [facility]    Generalized headaches     2008 MRI normal    Insomnia     normal sleep study 2008    Internal hemorrhoids     Low back pain     recurrent, sometimes w L leg numbness    Normal echocardiogram 1/2012    Pericarditis 2011    idiopathic    Plantar fasciitis of left foot     Dr. Kelby Solo. tear 1/14/14. MRI 3/2014 showed tear    Radicular pain of right lower back 6/15/16    Rupture of rectus femoris tendon 06/2016    MRI 7/18/16       Past Surgical History:   Procedure Laterality Date    HX COLONOSCOPY  3/22/07    Dr. Rashid Weems. normal    HX HEART CATHETERIZATION  2011    due to pericarditiis, normal    HX ORTHOPAEDIC Left     heel surgery from accident.  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 active in Worship. Lifestyle    Physical activity:     Days per week: Not on file     Minutes per session: Not on file    Stress: Not on file   Relationships    Social connections:     Talks on phone: Not on file     Gets together: Not on file     Attends [employer]ism service: Not on file     Active member of club or organization: Not on file     Attends meetings of clubs or organizations: Not on file     Relationship status: Not on file    Intimate partner violence:     Fear of current or ex partner: Not on file     Emotionally abused: Not on file     Physically abused: Not on file     Forced sexual activity: Not on file   Other Topics Concern    Not on file   Social History Narrative    Not on file         ALLERGIES: Percocet [oxycodone-acetaminophen] and Sulfa (sulfonamide antibiotics)    Review of Systems   Constitutional: Negative for chills and fever. HENT: Positive for congestion, rhinorrhea and sore throat. Negative for hearing loss, sinus pressure, trouble swallowing and voice change. Eyes: Negative for visual disturbance. Respiratory: Positive for cough and wheezing. Negative for shortness of breath. Cardiovascular: Negative for chest pain, palpitations and leg swelling. Gastrointestinal: Negative for abdominal pain, diarrhea, nausea and vomiting.    Genitourinary: Negative for flank pain. Musculoskeletal: Negative for arthralgias, back pain, myalgias and neck stiffness. Skin: Negative for rash. Neurological: Negative for dizziness, weakness and light-headedness. Hematological: Negative for adenopathy. Psychiatric/Behavioral: Negative for behavioral problems. Vitals:    02/14/20 0818   BP: 174/80   Pulse: 73   Resp: 18   Temp: 99.9 °F (37.7 °C)   SpO2: 98%   Weight: 89.8 kg (197 lb 15.6 oz)            Physical Exam  Vitals signs and nursing note reviewed. Constitutional:       General: He is not in acute distress. Appearance: He is well-developed. He is not ill-appearing or toxic-appearing. HENT:      Head: Normocephalic and atraumatic. Right Ear: Tympanic membrane normal.      Left Ear: Tympanic membrane normal.      Nose: Nose normal. No congestion or rhinorrhea. Mouth/Throat:      Mouth: Mucous membranes are moist.      Pharynx: Oropharynx is clear. No oropharyngeal exudate or posterior oropharyngeal erythema. Eyes:      General: No scleral icterus. Right eye: No discharge. Conjunctiva/sclera: Conjunctivae normal.   Neck:      Musculoskeletal: Neck supple. Vascular: No JVD. Trachea: No tracheal deviation. Cardiovascular:      Rate and Rhythm: Normal rate and regular rhythm. Heart sounds: No murmur. No friction rub. Pulmonary:      Effort: Pulmonary effort is normal. No respiratory distress. Breath sounds: Normal breath sounds. No wheezing or rales. Abdominal:      Palpations: Abdomen is soft. Tenderness: There is no abdominal tenderness. Musculoskeletal: Normal range of motion. General: No swelling, tenderness or deformity. Right lower leg: No edema. Left lower leg: No edema. Lymphadenopathy:      Cervical: No cervical adenopathy. Skin:     General: Skin is warm and dry. Capillary Refill: Capillary refill takes less than 2 seconds. Findings: No rash.    Neurological: Mental Status: He is alert and oriented to person, place, and time. Psychiatric:         Mood and Affect: Mood normal.         Behavior: Behavior normal.          MDM  Number of Diagnoses or Management Options  Upper respiratory tract infection, unspecified type:         VITAL SIGNS:  Patient Vitals for the past 4 hrs:   Temp Pulse Resp BP SpO2   02/14/20 0818 99.9 °F (37.7 °C) 73 18 174/80 98 %         LABS:  No results found for this or any previous visit (from the past 6 hour(s)). IMAGING:  XR CHEST PA LAT   Final Result   Impression: No acute process or change compared to the prior exam.               Medications During Visit:  Medications   benzonatate (TESSALON) capsule 200 mg (200 mg Oral Given 2/14/20 0848)   predniSONE (DELTASONE) tablet 50 mg (50 mg Oral Given 2/14/20 0848)         DECISION MAKING:  Radha Kiran is a 62 y.o. male who comes in as above. Here, patient appears well. No wheezing and based on his underlying asthma and history of wheezing I will provide him with steroids for 5 days and asked that he use his inhaler at home for outbreaks of shortness of breath. X-ray is normal.  Patient will otherwise be treated as a normal upper respiratory infection. Tessalon for cough and over-the-counter medications for decongestant. Patient is agreeable to this plan and at disposition is stable. All questions answered. IMPRESSION:  1. Upper respiratory tract infection, unspecified type        DISPOSITION:  Discharged      Discharge Medication List as of 2/14/2020  9:07 AM      START taking these medications    Details   benzonatate (TESSALON PERLES) 100 mg capsule Take 2 Caps by mouth three (3) times daily as needed for Cough for up to 7 days. , Normal, Disp-30 Cap, R-0      predniSONE (DELTASONE) 50 mg tablet Take 1 Tab by mouth daily for 4 days. , Normal, Disp-4 Tab, R-0         CONTINUE these medications which have NOT CHANGED    Details   omeprazole (PRILOSEC OTC) 20 mg tablet Take 20 mg by mouth daily. , Historical Med      diclofenac EC (VOLTAREN) 75 mg EC tablet Take 1 Tab by mouth two (2) times a day., Normal, Disp-40 Tab, R-0      nystatin-triamcinolone (MYCOLOG II) topical cream Apply  to affected area two (2) times a day., Normal, Disp-30 g, R-0      fluticasone (FLOVENT HFA) 220 mcg/actuation inhaler Take 1 Puff by inhalation two (2) times a day., Normal, Disp-1 Inhaler, R-3      albuterol (PROVENTIL HFA, VENTOLIN HFA, PROAIR HFA) 90 mcg/actuation inhaler Take 2 Puffs by inhalation every six (6) hours as needed for Wheezing., Normal, Disp-1 Inhaler, R-5      hydrocortisone (ANUSOL-HC) 25 mg supp Insert 1 Suppository into rectum two (2) times a day. Indications: hemorrhoids, Normal, Disp-24 Suppository, R-1      MELATONIN PO Take 1 Tab by mouth as needed., Historical Med              Follow-up Information     Follow up With Specialties Details Why Contact Info    Jeremy Orona MD Internal Medicine Schedule an appointment as soon as possible for a visit   [address] [place] [address]  [phone]              The patient is asked to follow-up with their primary care provider in the next several days. They are to call tomorrow for an appointment. The patient is asked to return promptly for any increased concerns or worsening of symptoms. They can return to this emergency department or any other emergency department.       Procedures

## 2020-02-17 ENCOUNTER — OFFICE VISIT (OUTPATIENT)
Dept: INTERNAL MEDICINE CLINIC | Age: 65
End: 2020-02-17

## 2020-02-17 VITALS
HEIGHT: 68 IN | DIASTOLIC BLOOD PRESSURE: 76 MMHG | BODY MASS INDEX: 30.92 KG/M2 | OXYGEN SATURATION: 97 % | TEMPERATURE: 97.6 F | RESPIRATION RATE: 20 BRPM | WEIGHT: 204 LBS | SYSTOLIC BLOOD PRESSURE: 148 MMHG | HEART RATE: 54 BPM

## 2020-02-17 DIAGNOSIS — J20.9 ACUTE BRONCHITIS, UNSPECIFIED ORGANISM: Primary | ICD-10-CM

## 2020-02-17 RX ORDER — AMOXICILLIN AND CLAVULANATE POTASSIUM 875; 125 MG/1; MG/1
1 TABLET, FILM COATED ORAL 2 TIMES DAILY
Qty: 14 TAB | Refills: 0 | Status: SHIPPED | OUTPATIENT
Start: 2020-02-17 | End: 2020-02-24

## 2020-02-17 NOTE — PROGRESS NOTES
HISTORY OF PRESENT ILLNESS    Chief Complaint   Patient presents with    Cold Symptoms    Cough       Presents for follow-up  Travel to [facility] 2/3-2/13/20. Cough, chest congestion, sinus congestion,chest congestion, low grade fever for 4 days. Was concerned about travel exposure and went to ER. No travel to [place] or contact w travelers or known sick contacts  In ER, CXR normal 2/14. Given tessalon, prednisone due to asthma . Reports cough, chest congestion since then. Fatigue. No SOB, fever  Wife w same and is doing better on augmentin  Taking flovent, albuterol    Review of Systems   All other systems reviewed and are negative, except as noted in HPI    Past Medical and Surgical History   has a past medical history of Asthma, Basal cell carcinoma, Chronic sinus bradycardia, Current mild episode of major depressive disorder without prior episode ([facility] (9/7/2018), Generalized headaches, Insomnia, Internal hemorrhoids, Low back pain, Normal echocardiogram (1/2012), Pericarditis (2011), Plantar fasciitis of left foot, Radicular pain of right lower back (6/15/16), and Rupture of rectus femoris tendon (06/2016). has a past surgical history that includes hx heart catheterization (2011); hx colonoscopy (3/22/07); and hx orthopaedic (Left). reports that he has never smoked. He has never used smokeless tobacco. He reports that he does not drink alcohol.  family history includes Arthritis-rheumatoid in his sister; Cancer in his mother; Heart Disease in his father; SLE in his sister; Stroke in his father. Physical Exam   Nursing note and vitals reviewed. Blood pressure 148/76, pulse (!) 54, temperature 97.6 °F (36.4 °C), temperature source Oral, resp. rate 20, height 5' 8\" (1.727 m), weight 204 lb (92.5 kg), SpO2 97 %. Constitutional:  No distress. Eyes: Conjunctivae are normal.   Ears:  Hearing grossly intact  Cardiovascular: Normal rate.   regular rhythm, no murmurs or gallops  No edema  Pulmonary/Chest: Effort normal.   CTAB  Musculoskeletal: moves all 4 extremities   Neurological: Alert and oriented to person, place, and time. Skin: No rash noted. Psychiatric: Normal mood and affect. Behavior is normal.     ASSESSMENT and PLAN  Diagnoses and all orders for this visit:    1. Acute bronchitis, unspecified organism  Worsening after 4 days. Hx asthma, but no wheezing now  Completing prednisone  rx augmentin  Cont inhalers  -     amoxicillin-clavulanate (AUGMENTIN) 875-125 mg per tablet; Take 1 Tab by mouth two (2) times a day for 7 days. lab results and schedule of future lab studies reviewed with patient  reviewed medications and side effects in detail    Return to clinic for further evaluation if new symptoms develop        Current Outpatient Medications   Medication Sig    amoxicillin-clavulanate (AUGMENTIN) 875-125 mg per tablet Take 1 Tab by mouth two (2) times a day for 7 days.  benzonatate (TESSALON PERLES) 100 mg capsule Take 2 Caps by mouth three (3) times daily as needed for Cough for up to 7 days.  predniSONE (DELTASONE) 50 mg tablet Take 1 Tab by mouth daily for 4 days.  omeprazole (PRILOSEC OTC) 20 mg tablet Take 20 mg by mouth daily.  diclofenac EC (VOLTAREN) 75 mg EC tablet Take 1 Tab by mouth two (2) times a day.  nystatin-triamcinolone (MYCOLOG II) topical cream Apply  to affected area two (2) times a day.  hydrocortisone (ANUSOL-HC) 25 mg supp Insert 1 Suppository into rectum two (2) times a day. Indications: hemorrhoids    fluticasone (FLOVENT HFA) 220 mcg/actuation inhaler Take 1 Puff by inhalation two (2) times a day. (Patient taking differently: Take 1 Puff by inhalation two (2) times daily as needed.)    albuterol (PROVENTIL HFA, VENTOLIN HFA, PROAIR HFA) 90 mcg/actuation inhaler Take 2 Puffs by inhalation every six (6) hours as needed for Wheezing.  MELATONIN PO Take 1 Tab by mouth as needed.      No current facility-administered medications for this visit.

## 2020-02-17 NOTE — LETTER
NOTIFICATION RETURN TO WORK / SCHOOL 
 
2/17/2020 11:29 AM 
 
Mr. Ralph Reece [phone] To Whom It May Concern: 
 
Jennifer Lopez is currently under the care of [address] [place] [address],[address]. He will return to work/school on: 2/19/20 If there are questions or concerns please have the patient contact our office.  
 
 
 
Sincerely, 
 
 
Nicole Glover MD

## 2020-03-04 ENCOUNTER — OFFICE VISIT (OUTPATIENT)
Dept: INTERNAL MEDICINE CLINIC | Age: 65
End: 2020-03-04

## 2020-03-04 ENCOUNTER — HOSPITAL ENCOUNTER (OUTPATIENT)
Dept: LAB | Age: 65
Discharge: HOME | End: 2020-03-04

## 2020-03-04 VITALS
TEMPERATURE: 98.5 F | RESPIRATION RATE: 18 BRPM | HEIGHT: 68 IN | WEIGHT: 199.38 LBS | HEART RATE: 47 BPM | SYSTOLIC BLOOD PRESSURE: 134 MMHG | BODY MASS INDEX: 30.22 KG/M2 | DIASTOLIC BLOOD PRESSURE: 77 MMHG | OXYGEN SATURATION: 98 %

## 2020-03-04 DIAGNOSIS — R53.82 CHRONIC FATIGUE: ICD-10-CM

## 2020-03-04 DIAGNOSIS — R51.9 GENERALIZED HEADACHES: ICD-10-CM

## 2020-03-04 DIAGNOSIS — R06.83 SNORING: ICD-10-CM

## 2020-03-04 DIAGNOSIS — Z12.5 SCREENING PSA (PROSTATE SPECIFIC ANTIGEN): ICD-10-CM

## 2020-03-04 DIAGNOSIS — Z00.00 PREVENTATIVE HEALTH CARE: ICD-10-CM

## 2020-03-04 DIAGNOSIS — J01.91 ACUTE RECURRENT SINUSITIS, UNSPECIFIED LOCATION: Primary | ICD-10-CM

## 2020-03-04 LAB
ALBUMIN SERPL-MCNC: 4.1 G/DL (ref 3.5–5)
ALBUMIN/GLOB SERPL: 1.2 {RATIO} (ref 1.1–2.2)
ALP SERPL-CCNC: 81 U/L (ref 45–117)
ALT SERPL-CCNC: 34 U/L (ref 12–78)
ANION GAP SERPL CALC-SCNC: 4 MMOL/L (ref 5–15)
AST SERPL-CCNC: 14 U/L (ref 15–37)
BILIRUB SERPL-MCNC: 0.7 MG/DL (ref 0.2–1)
BUN SERPL-MCNC: 14 MG/DL (ref 6–20)
BUN/CREAT SERPL: 15 (ref 12–20)
CALCIUM SERPL-MCNC: 8.9 MG/DL (ref 8.5–10.1)
CHLORIDE SERPL-SCNC: 104 MMOL/L (ref 97–108)
CHOLEST SERPL-MCNC: 210 MG/DL
CO2 SERPL-SCNC: 30 MMOL/L (ref 21–32)
CREAT SERPL-MCNC: 0.96 MG/DL (ref 0.7–1.3)
ERYTHROCYTE [DISTWIDTH] IN BLOOD BY AUTOMATED COUNT: 12.4 % (ref 11.5–14.5)
GLOBULIN SER CALC-MCNC: 3.3 G/DL (ref 2–4)
GLUCOSE SERPL-MCNC: 85 MG/DL (ref 65–100)
HCT VFR BLD AUTO: 46.3 % (ref 36.6–50.3)
HDLC SERPL-MCNC: 66 MG/DL
HDLC SERPL: 3.2 {RATIO} (ref 0–5)
HGB BLD-MCNC: 15.1 G/DL (ref 12.1–17)
LDLC SERPL CALC-MCNC: 130.4 MG/DL (ref 0–100)
LIPID PROFILE,FLP: ABNORMAL
MCH RBC QN AUTO: 31.1 PG (ref 26–34)
MCHC RBC AUTO-ENTMCNC: 32.6 G/DL (ref 30–36.5)
MCV RBC AUTO: 95.5 FL (ref 80–99)
NRBC # BLD: 0 K/UL (ref 0–0.01)
NRBC BLD-RTO: 0 PER 100 WBC
PLATELET # BLD AUTO: 243 K/UL (ref 150–400)
PMV BLD AUTO: 11.4 FL (ref 8.9–12.9)
POTASSIUM SERPL-SCNC: 4.2 MMOL/L (ref 3.5–5.1)
PROT SERPL-MCNC: 7.4 G/DL (ref 6.4–8.2)
RBC # BLD AUTO: 4.85 M/UL (ref 4.1–5.7)
SODIUM SERPL-SCNC: 138 MMOL/L (ref 136–145)
TRIGL SERPL-MCNC: 68 MG/DL (ref ?–150)
TSH SERPL DL<=0.05 MIU/L-ACNC: 0.8 UIU/ML (ref 0.36–3.74)
VLDLC SERPL CALC-MCNC: 13.6 MG/DL
WBC # BLD AUTO: 10.1 K/UL (ref 4.1–11.1)

## 2020-03-04 RX ORDER — CEFDINIR 300 MG/1
300 CAPSULE ORAL 2 TIMES DAILY
Qty: 20 CAP | Refills: 0 | Status: SHIPPED | OUTPATIENT
Start: 2020-03-04 | End: 2020-03-14

## 2020-03-04 RX ORDER — IBUPROFEN 200 MG
TABLET ORAL
COMMUNITY

## 2020-03-04 RX ORDER — PREDNISONE 20 MG/1
TABLET ORAL
Qty: 18 TAB | Refills: 0 | Status: SHIPPED | OUTPATIENT
Start: 2020-03-04 | End: 2020-03-17 | Stop reason: SDUPTHER

## 2020-03-05 NOTE — PROGRESS NOTES
HISTORY OF PRESENT ILLNESS    Chief Complaint   Patient presents with    Cold Symptoms       Presents for follow-up    Sinus congestion since 2/12/20. Given prednisone 2/14, then augmentin 2/17. S/s improved some, but then reports increased sinus congestion and pressure over the past 4 days. No fever, chills, SOB, or wheezing. Reports more allergy s/s as well    Reports fatigue for years. Nods off at work, when watching TV, when taking to his wife at times. Feels tired driving home from work. Hx of past sleep study which he says was not good since he did not sleep well in sleep labs. Wife says he snores some. He wakes up 2-3 x per night, as far as he knows. Review of Systems   All other systems reviewed and are negative, except as noted in HPI    Past Medical and Surgical History   has a past medical history of Asthma, Basal cell carcinoma, Chronic sinus bradycardia, Current mild episode of major depressive disorder without prior episode ([facility] (9/7/2018), Generalized headaches, Insomnia, Internal hemorrhoids, Low back pain, Normal echocardiogram (1/2012), Pericarditis (2011), Plantar fasciitis of left foot, Radicular pain of right lower back (6/15/16), and Rupture of rectus femoris tendon (06/2016). has a past surgical history that includes hx heart catheterization (2011); hx colonoscopy (3/22/07); and hx orthopaedic (Left). reports that he has never smoked. He has never used smokeless tobacco. He reports that he does not drink alcohol.  family history includes Arthritis-rheumatoid in his sister; Cancer in his mother; Heart Disease in his father; SLE in his sister; Stroke in his father. Physical Exam   Nursing note and vitals reviewed. Blood pressure 134/77, pulse (!) 47, temperature 98.5 °F (36.9 °C), temperature source Oral, resp. rate 18, height 5' 8\" (1.727 m), weight 199 lb 6 oz (90.4 kg), SpO2 98 %. Constitutional:  No distress.     Eyes: Conjunctivae are normal.   Ears:  Hearing grossly intact  Cardiovascular: Normal rate. regular rhythm, no murmurs or gallops  No edema  Pulmonary/Chest: Effort normal.   CTAB  Musculoskeletal: moves all 4 extremities   Neurological: Alert and oriented to person, place, and time. Skin: No rash noted. Psychiatric: Normal mood and affect. Behavior is normal.     ASSESSMENT and PLAN  Diagnoses and all orders for this visit:    1. Acute recurrent sinusitis, unspecified location  recurrrent  -    start cefdinir (OMNICEF) 300 mg capsule; Take 1 Cap by mouth two (2) times a day for 10 days. -    If not improving, predniSONE (DELTASONE) 20 mg tablet; Take 3 pills for 3 days, then 2 pills for 3 days, then 1 pill for 3 days    2. Chronic fatigue - strongly suspect sleep disorder. Will refer for home sleep study. Witnessed snoring, headaches, daytime fatigue  -     CBC W/O DIFF; Future  -     TSH 3RD GENERATION; Future  -     REFERRAL TO SLEEP STUDIES    3. Snoring  -     REFERRAL TO SLEEP STUDIES    4. Generalized headaches  -     REFERRAL TO SLEEP STUDIES    5. Screening PSA (prostate specific antigen)  -     PSA W/ REFLX FREE PSA; Future    6. Preventative health care  -     LIPID PANEL; Future  -     METABOLIC PANEL, COMPREHENSIVE; Future      lab results and schedule of future lab studies reviewed with patient  reviewed medications and side effects in detail    Return to clinic for further evaluation if new symptoms develop        Current Outpatient Medications   Medication Sig    ibuprofen (MOTRIN) 200 mg tablet Take  by mouth every six (6) hours as needed for Pain.  Cetirizine (ZYRTEC) 10 mg cap Take  by mouth daily.  guaiFENesin (MUCINEX) 1,200 mg Ta12 ER tablet Take 1,200 mg by mouth two (2) times a day.  cefdinir (OMNICEF) 300 mg capsule Take 1 Cap by mouth two (2) times a day for 10 days.     predniSONE (DELTASONE) 20 mg tablet Take 3 pills for 3 days, then 2 pills for 3 days, then 1 pill for 3 days    omeprazole (PRILOSEC OTC) 20 mg tablet Take 20 mg by mouth two (2) times a day.  nystatin-triamcinolone (MYCOLOG II) topical cream Apply  to affected area two (2) times a day. (Patient taking differently: Apply  to affected area daily as needed.)    hydrocortisone (ANUSOL-HC) 25 mg supp Insert 1 Suppository into rectum two (2) times a day. Indications: hemorrhoids    fluticasone (FLOVENT HFA) 220 mcg/actuation inhaler Take 1 Puff by inhalation two (2) times a day. (Patient taking differently: Take 1 Puff by inhalation two (2) times daily as needed.)    albuterol (PROVENTIL HFA, VENTOLIN HFA, PROAIR HFA) 90 mcg/actuation inhaler Take 2 Puffs by inhalation every six (6) hours as needed for Wheezing. No current facility-administered medications for this visit.

## 2020-03-06 ENCOUNTER — TELEPHONE (OUTPATIENT)
Dept: INTERNAL MEDICINE CLINIC | Age: 65
End: 2020-03-06

## 2020-03-06 LAB
PSA SERPL-MCNC: 0.3 NG/ML (ref 0–4)
REFLEX CRITERIA: NORMAL

## 2020-03-06 NOTE — TELEPHONE ENCOUNTER
Per Rep with states kelli we told patient his auth for a sleep study was denied but per rep an authorization was not requested by them , PSR was not able to confirm notation stating Richard Calderon was denied nor could confirm a sleep study was ordered. PSR was able to locate a ref / doctor's order  to sleep studies .   Requested we follow up with the patient

## 2020-03-06 NOTE — TELEPHONE ENCOUNTER
----- Message from Marika Perdomo sent at 3/6/2020  3:29 PM EST -----  Regarding: Dr. Lewis Haver: [phone]  Mathieu Butts, with [facility] is requesting a call back in reference in to a prior authorization for a sleep study. She states that she doesn't have an authorization on file.

## 2020-03-11 NOTE — TELEPHONE ENCOUNTER
There is no insurance referral required Per [facility]. Per chart no sleep study has been scheduled as of yet within the [facility] system.

## 2020-03-17 ENCOUNTER — OFFICE VISIT (OUTPATIENT)
Dept: INTERNAL MEDICINE CLINIC | Age: 65
End: 2020-03-17

## 2020-03-17 VITALS
TEMPERATURE: 97.9 F | BODY MASS INDEX: 30.92 KG/M2 | WEIGHT: 204 LBS | RESPIRATION RATE: 16 BRPM | DIASTOLIC BLOOD PRESSURE: 82 MMHG | SYSTOLIC BLOOD PRESSURE: 138 MMHG | OXYGEN SATURATION: 98 % | HEIGHT: 68 IN | HEART RATE: 48 BPM

## 2020-03-17 DIAGNOSIS — H92.02 OTALGIA, LEFT: ICD-10-CM

## 2020-03-17 DIAGNOSIS — M62.838 NECK MUSCLE SPASM: ICD-10-CM

## 2020-03-17 DIAGNOSIS — J01.91 ACUTE RECURRENT SINUSITIS, UNSPECIFIED LOCATION: Primary | ICD-10-CM

## 2020-03-17 DIAGNOSIS — R03.0 BLOOD PRESSURE ELEVATED WITHOUT HISTORY OF HTN: ICD-10-CM

## 2020-03-17 DIAGNOSIS — J02.9 SORE THROAT: ICD-10-CM

## 2020-03-17 RX ORDER — PREDNISONE 20 MG/1
TABLET ORAL
Qty: 18 TAB | Refills: 0 | Status: SHIPPED | OUTPATIENT
Start: 2020-03-17 | End: 2020-03-26

## 2020-03-17 RX ORDER — CEFDINIR 300 MG/1
300 CAPSULE ORAL 2 TIMES DAILY
Qty: 42 CAP | Refills: 0 | Status: SHIPPED | OUTPATIENT
Start: 2020-03-17 | End: 2020-04-07

## 2020-03-17 RX ORDER — MONTELUKAST SODIUM 10 MG/1
10 TABLET ORAL DAILY
Qty: 30 TAB | Refills: 5
Start: 2020-03-17 | End: 2022-08-05 | Stop reason: ALTCHOICE

## 2020-03-17 NOTE — PROGRESS NOTES
HISTORY OF PRESENT ILLNESS    Chief Complaint   Patient presents with    Sore Throat       Presents for follow-up    Reports cold symptoms and Sinus congestion since 2/12/20. Given prednisone 2/14, then augmentin 2/17. S/s improved some, but then reports increased sinus congestion and pressure  Was given Omnicef for 10 days and prednisone for 9 days 3/4  His symptoms improve dramatically and he felt almost normal at the time that he completed his antibiotics on 3/14. Over the past 3 days, he has developed an increase sore throat, mild headache, mild bilateral submandibular gland tenderness, and mild bilateral neck muscle discomfort and perhaps mild cough. Denies any fevers or chills or shortness of breath. He has started taking his wife's montelukast in case allergies were contributing. Review of Systems   All other systems reviewed and are negative, except as noted in HPI    Past Medical and Surgical History   has a past medical history of Asthma, Basal cell carcinoma, Chronic sinus bradycardia, Current mild episode of major depressive disorder without prior episode ([facility] [address] (9/7/2018), Generalized headaches, Insomnia, Internal hemorrhoids, Low back pain, Normal echocardiogram (1/2012), Pericarditis (2011), Plantar fasciitis of left foot, Radicular pain of right lower back (6/15/16), and Rupture of rectus femoris tendon (06/2016). has a past surgical history that includes hx heart catheterization (2011); hx colonoscopy (3/22/07); and hx orthopaedic (Left). reports that he has never smoked. He has never used smokeless tobacco. He reports that he does not drink alcohol or use drugs. family history includes Arthritis-rheumatoid in his sister; Cancer in his mother; Heart Disease in his father; SLE in his sister; Stroke in his father. Physical Exam   Nursing note and vitals reviewed. Blood pressure 138/82, pulse (!) 48, temperature 97.9 °F (36.6 °C), temperature source Oral, resp.  rate 16, height 5' 8\" (1.727 m), weight 204 lb (92.5 kg), SpO2 98 %. Constitutional:  No distress. Eyes: Conjunctivae are normal.   Ears:  Hearing grossly intact  Nose. Significant erythema bilateral nasal mucosal membranes. Very mild posterior erythema of pharynx. No lymphadenopathy or tonsillar enlargement or exudates. Mildly tender submandibular glands bilaterally. Cardiovascular: Normal rate. regular rhythm, no murmurs or gallops  No edema  Pulmonary/Chest: Effort normal.   CTAB  Musculoskeletal: moves all 4 extremities   Neurological: Alert and oriented to person, place, and time. Skin: No rash noted. Psychiatric: Normal mood and affect. Behavior is normal.    Diagnoses and all orders for this visit:    1. Acute recurrent sinusitis, unspecified location   Symptoms for over 35 days. Improvement after taking 10 days of cefdinir and prednisone. This is not viral, most likely bacterial sinusitis. Allergies may contribute but I do not think this is primarily an allergy issue based on exam.  Will give 3-week course of antibiotics and a 9-day course of prednisone. Next step would be to consider Levaquin as an antibiotic or refer to ENT for nasal evaluation and potential culture. -     cefdinir (OMNICEF) 300 mg capsule; Take 1 Cap by mouth two (2) times a day for 21 days. -     montelukast (SINGULAIR) 10 mg tablet; Take 1 Tab by mouth daily. -     predniSONE (DELTASONE) 20 mg tablet; Take 3 pills for 3 days, then 2 pills for 3 days, then 1 pill for 3 days    2. Sore throat  3. Otalgia, left  4. Neck muscle spasm  All of these are mild and secondary to above. Reassured. 5. Blood pressure elevated without history of HTN  Blood pressure was elevated on arrival in the office. Improved after resting. Will monitor for now.   Undergoing sleep apnea evaluation as well.      lab results and schedule of future lab studies reviewed with patient  reviewed medications and side effects in detail    Return to clinic for further evaluation if new symptoms develop        Current Outpatient Medications   Medication Sig    cefdinir (OMNICEF) 300 mg capsule Take 1 Cap by mouth two (2) times a day for 21 days.  montelukast (SINGULAIR) 10 mg tablet Take 1 Tab by mouth daily.  predniSONE (DELTASONE) 20 mg tablet Take 3 pills for 3 days, then 2 pills for 3 days, then 1 pill for 3 days    ibuprofen (MOTRIN) 200 mg tablet Take  by mouth every six (6) hours as needed for Pain.  omeprazole (PRILOSEC OTC) 20 mg tablet Take 20 mg by mouth two (2) times a day.  fluticasone (FLOVENT HFA) 220 mcg/actuation inhaler Take 1 Puff by inhalation two (2) times a day. (Patient taking differently: Take 1 Puff by inhalation two (2) times daily as needed.)    albuterol (PROVENTIL HFA, VENTOLIN HFA, PROAIR HFA) 90 mcg/actuation inhaler Take 2 Puffs by inhalation every six (6) hours as needed for Wheezing. No current facility-administered medications for this visit.

## 2020-04-23 PROBLEM — G47.33 OSA (OBSTRUCTIVE SLEEP APNEA): Status: ACTIVE | Noted: 2020-04-16

## 2020-05-06 ENCOUNTER — TELEPHONE (OUTPATIENT)
Dept: INTERNAL MEDICINE CLINIC | Age: 65
End: 2020-05-06

## 2020-05-06 NOTE — TELEPHONE ENCOUNTER
----- Message from Bj Spring sent at 5/6/2020 10:50 AM EDT -----  Regarding: Dr. Sandoval Michael: [phone]  Garry Severance, with [address] [place] [address] is requesting office visit notes pertaining to patient's sleep issues.   Fax: [phone]

## 2020-08-27 ENCOUNTER — OFFICE VISIT (OUTPATIENT)
Dept: INTERNAL MEDICINE CLINIC | Age: 65
End: 2020-08-27
Payer: COMMERCIAL

## 2020-08-27 VITALS
HEART RATE: 50 BPM | TEMPERATURE: 98 F | DIASTOLIC BLOOD PRESSURE: 77 MMHG | OXYGEN SATURATION: 95 % | WEIGHT: 205 LBS | BODY MASS INDEX: 31.07 KG/M2 | RESPIRATION RATE: 14 BRPM | SYSTOLIC BLOOD PRESSURE: 122 MMHG | HEIGHT: 68 IN

## 2020-08-27 DIAGNOSIS — G47.33 OSA (OBSTRUCTIVE SLEEP APNEA): Primary | ICD-10-CM

## 2020-08-27 DIAGNOSIS — J45.20 MILD INTERMITTENT ASTHMA WITHOUT COMPLICATION: ICD-10-CM

## 2020-08-27 PROCEDURE — 99213 OFFICE O/P EST LOW 20 MIN: CPT | Performed by: INTERNAL MEDICINE

## 2020-08-27 NOTE — PROGRESS NOTES
HISTORY OF PRESENT ILLNESS    Chief Complaint   Patient presents with    Follow-up     sleep apnea       Presents for follow-up    JUSTINO on CPAP  Dx 4.16.20  Reports the pressure is increasing at night and it is causing him to fall asleep ok for about 3 hours and then he wakes up due to pressure \"ramping up\" too much and it will make him sleep with mouth open and when he opens his mouth, the pressure will be relieved. Also, the seal around mask is broken at times. Has to have the mask adjusted \"jjust right\" to have it work. He called [facility] and is talking to them about changing mask. Says his wife feels his fatigue has improved some since starting mask. Trying to sleep on side more than back. Mild asthma s/s. Heat worsen mild wheezing  Using flovent again now. Wt Readings from Last 3 Encounters:   08/27/20 205 lb (93 kg)   03/17/20 204 lb (92.5 kg)   03/04/20 199 lb 6 oz (90.4 kg)         Review of Systems   All other systems reviewed and are negative, except as noted in HPI    Past Medical and Surgical History   has a past medical history of Asthma, Basal cell carcinoma, Chronic sinus bradycardia, Current mild episode of major depressive disorder without prior episode ([facility] [address] (9/7/2018), Generalized headaches, Insomnia, Internal hemorrhoids, Low back pain, Normal echocardiogram (1/2012), JUSTINO (obstructive sleep apnea) (04/16/2020), Pericarditis (2011), Plantar fasciitis of left foot, Radicular pain of right lower back (6/15/16), and Rupture of rectus femoris tendon (06/2016). has a past surgical history that includes hx heart catheterization (2011); hx colonoscopy (3/22/07); and hx orthopaedic (Left). reports that he has never smoked. He has never used smokeless tobacco. He reports that he does not drink alcohol or use drugs.   family history includes Arthritis-rheumatoid in his sister; Cancer in his mother; Heart Disease in his father; SLE in his sister; Stroke in his father. Physical Exam   Nursing note and vitals reviewed. Blood pressure 122/77, pulse (!) 50, temperature 98 °F (36.7 °C), temperature source Oral, resp. rate 14, height 5' 8\" (1.727 m), weight 205 lb (93 kg), SpO2 95 %. Constitutional:  No distress. Eyes: Conjunctivae are normal.   Ears:  Hearing grossly intact  Cardiovascular: Normal rate. regular rhythm, no murmurs or gallops  No edema  Pulmonary/Chest: Effort normal.   CTAB  Musculoskeletal: moves all 4 extremities   Neurological: Alert and oriented to person, place, and time. Skin: No rash noted. Psychiatric: Normal mood and affect. Behavior is normal.     ASSESSMENT and PLAN  Diagnoses and all orders for this visit:    1. JUSTINO (obstructive sleep apnea)  He is not completely tolerating his current CPAP device. He will consult with Diana Castro and  [facility] medical.  Some symptomatic improvement with CPAP and continues to benefit from therapy. Has been compliant when he can tolerate it. I am happy to refer him to a sleep specialist if needed. 2. Mild intermittent asthma without complication  Symptoms are stable at this point. Continue controllers. lab results and schedule of future lab studies reviewed with patient  reviewed medications and side effects in detail    Return to clinic for further evaluation if new symptoms develop        Current Outpatient Medications   Medication Sig    montelukast (SINGULAIR) 10 mg tablet Take 1 Tab by mouth daily.  ibuprofen (MOTRIN) 200 mg tablet Take  by mouth every six (6) hours as needed for Pain.  omeprazole (PRILOSEC OTC) 20 mg tablet Take 20 mg by mouth two (2) times a day.  fluticasone (FLOVENT HFA) 220 mcg/actuation inhaler Take 1 Puff by inhalation two (2) times a day.  (Patient taking differently: Take 1 Puff by inhalation two (2) times daily as needed.)    albuterol (PROVENTIL HFA, VENTOLIN HFA, PROAIR HFA) 90 mcg/actuation inhaler Take 2 Puffs by inhalation every six (6) hours as needed for Wheezing. No current facility-administered medications for this visit.

## 2020-10-06 RX ORDER — ALBUTEROL SULFATE 90 UG/1
2 AEROSOL, METERED RESPIRATORY (INHALATION)
Qty: 1 INHALER | Refills: 5 | Status: SHIPPED | OUTPATIENT
Start: 2020-10-06

## 2020-10-06 RX ORDER — FLUTICASONE PROPIONATE 220 UG/1
1 AEROSOL, METERED RESPIRATORY (INHALATION) 2 TIMES DAILY
Qty: 1 INHALER | Refills: 3 | Status: SHIPPED | OUTPATIENT
Start: 2020-10-06 | End: 2021-12-03 | Stop reason: ALTCHOICE

## 2021-12-09 ENCOUNTER — DOCUMENTATION ONLY (OUTPATIENT)
Dept: INTERNAL MEDICINE CLINIC | Age: 66
End: 2021-12-09

## 2021-12-09 ENCOUNTER — TELEPHONE (OUTPATIENT)
Dept: INTERNAL MEDICINE CLINIC | Age: 66
End: 2021-12-09

## 2021-12-09 NOTE — TELEPHONE ENCOUNTER
PSR reached out to patient to reschedule his appointment on 12/31 moved his appointment to 01/14 at 100PM left a VM/My chart message to let him know of the change.

## 2022-01-12 ENCOUNTER — TELEPHONE (OUTPATIENT)
Dept: INTERNAL MEDICINE CLINIC | Age: 67
End: 2022-01-12

## 2022-01-12 NOTE — TELEPHONE ENCOUNTER
PSR reached out to patient to reschedule upcoming CPE due to provider being out of the office. Patient did not answer, left detailed VM for call back and sent my chart message. He was scheduled with his wife [both for CPE's] if patient returns call please schedule both for the same day.

## 2022-01-31 ENCOUNTER — OFFICE VISIT (OUTPATIENT)
Dept: INTERNAL MEDICINE CLINIC | Age: 67
End: 2022-01-31
Payer: COMMERCIAL

## 2022-01-31 VITALS
WEIGHT: 205 LBS | BODY MASS INDEX: 31.07 KG/M2 | RESPIRATION RATE: 15 BRPM | OXYGEN SATURATION: 97 % | SYSTOLIC BLOOD PRESSURE: 128 MMHG | TEMPERATURE: 98.5 F | HEIGHT: 68 IN | HEART RATE: 51 BPM | DIASTOLIC BLOOD PRESSURE: 78 MMHG

## 2022-01-31 DIAGNOSIS — H61.23 CERUMINOSIS, BILATERAL: ICD-10-CM

## 2022-01-31 DIAGNOSIS — R03.0 BLOOD PRESSURE ELEVATED WITHOUT HISTORY OF HTN: ICD-10-CM

## 2022-01-31 DIAGNOSIS — L29.0 ANAL PRURITUS: ICD-10-CM

## 2022-01-31 DIAGNOSIS — B37.2 CANDIDAL INTERTRIGO: ICD-10-CM

## 2022-01-31 DIAGNOSIS — Z23 ENCOUNTER FOR IMMUNIZATION: ICD-10-CM

## 2022-01-31 DIAGNOSIS — Z00.00 PREVENTATIVE HEALTH CARE: Primary | ICD-10-CM

## 2022-01-31 LAB
ALBUMIN SERPL-MCNC: 4.2 G/DL (ref 3.5–5)
ALBUMIN/GLOB SERPL: 1.1 {RATIO} (ref 1.1–2.2)
ALP SERPL-CCNC: 76 U/L (ref 45–117)
ALT SERPL-CCNC: 28 U/L (ref 12–78)
ANION GAP SERPL CALC-SCNC: 6 MMOL/L (ref 5–15)
AST SERPL-CCNC: 15 U/L (ref 15–37)
BILIRUB SERPL-MCNC: 0.3 MG/DL (ref 0.2–1)
BUN SERPL-MCNC: 19 MG/DL (ref 6–20)
BUN/CREAT SERPL: 18 (ref 12–20)
CALCIUM SERPL-MCNC: 9.1 MG/DL (ref 8.5–10.1)
CHLORIDE SERPL-SCNC: 107 MMOL/L (ref 97–108)
CHOLEST SERPL-MCNC: 230 MG/DL
CO2 SERPL-SCNC: 26 MMOL/L (ref 21–32)
CREAT SERPL-MCNC: 1.04 MG/DL (ref 0.7–1.3)
ERYTHROCYTE [DISTWIDTH] IN BLOOD BY AUTOMATED COUNT: 12 % (ref 11.5–14.5)
GLOBULIN SER CALC-MCNC: 3.8 G/DL (ref 2–4)
GLUCOSE SERPL-MCNC: 84 MG/DL (ref 65–100)
HCT VFR BLD AUTO: 49.3 % (ref 36.6–50.3)
HDLC SERPL-MCNC: 61 MG/DL
HDLC SERPL: 3.8 {RATIO} (ref 0–5)
HGB BLD-MCNC: 16.4 G/DL (ref 12.1–17)
LDLC SERPL CALC-MCNC: 141 MG/DL (ref 0–100)
MCH RBC QN AUTO: 31.7 PG (ref 26–34)
MCHC RBC AUTO-ENTMCNC: 33.3 G/DL (ref 30–36.5)
MCV RBC AUTO: 95.2 FL (ref 80–99)
NRBC # BLD: 0 K/UL (ref 0–0.01)
NRBC BLD-RTO: 0 PER 100 WBC
PLATELET # BLD AUTO: 249 K/UL (ref 150–400)
PMV BLD AUTO: 11.4 FL (ref 8.9–12.9)
POTASSIUM SERPL-SCNC: 4 MMOL/L (ref 3.5–5.1)
PROT SERPL-MCNC: 8 G/DL (ref 6.4–8.2)
RBC # BLD AUTO: 5.18 M/UL (ref 4.1–5.7)
SODIUM SERPL-SCNC: 139 MMOL/L (ref 136–145)
TRIGL SERPL-MCNC: 140 MG/DL (ref ?–150)
VLDLC SERPL CALC-MCNC: 28 MG/DL
WBC # BLD AUTO: 6.5 K/UL (ref 4.1–11.1)

## 2022-01-31 PROCEDURE — 90732 PPSV23 VACC 2 YRS+ SUBQ/IM: CPT | Performed by: INTERNAL MEDICINE

## 2022-01-31 PROCEDURE — 69210 REMOVE IMPACTED EAR WAX UNI: CPT | Performed by: INTERNAL MEDICINE

## 2022-01-31 PROCEDURE — 99397 PER PM REEVAL EST PAT 65+ YR: CPT | Performed by: INTERNAL MEDICINE

## 2022-01-31 PROCEDURE — 90471 IMMUNIZATION ADMIN: CPT | Performed by: INTERNAL MEDICINE

## 2022-01-31 PROCEDURE — 99213 OFFICE O/P EST LOW 20 MIN: CPT | Performed by: INTERNAL MEDICINE

## 2022-01-31 RX ORDER — NYSTATIN 100000 U/G
CREAM TOPICAL 2 TIMES DAILY
Qty: 30 G | Refills: 2 | Status: SHIPPED | OUTPATIENT
Start: 2022-01-31

## 2022-02-01 LAB
PSA SERPL-MCNC: 0.2 NG/ML (ref 0–4)
REFLEX CRITERIA: NORMAL

## 2022-02-01 NOTE — PROGRESS NOTES
Nevaeh Dumont is a 73 y.o. male  Presenting for his annual checkup and health maintenance review and follow-up    Reports intermittent rectal pruritus. He is concerned that he may have some sort of infection. Wonders what the culture should be done. He says he has some itching after bowel movements. Has tried Neosporin. Colonoscopy 2017 showed hemorrhoids only. Borderline hypertension. Taking her medications for blood pressure. Doing well. History of asthma. Taking Pulmicort, Singulair and albuterol as needed. Reports recent control of symptoms with no significant wheezing.       Exercise: moderately active  Diet: generally follows a low fat low cholesterol diet  Health Maintenance   Topic Date Due    Depression Screen  01/31/2023    DTaP/Tdap/Td series (4 - Td or Tdap) 02/15/2026    Lipid Screen  01/31/2027    Colorectal Cancer Screening Combo  09/22/2027    Hepatitis C Screening  Completed    Shingrix Vaccine Age 50>  Completed    Flu Vaccine  Completed    COVID-19 Vaccine  Completed    Pneumococcal 65+ years  Completed     Health Maintenance reviewed  Last digital rectal exam:  none  Lab Results   Component Value Date/Time    Prostate Specific Ag 0.3 03/04/2020 02:11 PM    Prostate Specific Ag 0.3 01/11/2019 08:48 AM    Prostate Specific Ag 0.2 04/11/2017 08:55 AM       Vaccinations reviewed  Immunization History   Administered Date(s) Administered    COVID-19, Moderna Booster, PF, 0.25mL Dose 01/27/2022    COVID-19, Moderna, Primary or Immunocompromised Series, MRNA, PF, 100mcg/0.5mL 04/15/2021, 05/13/2021    Influenza Vaccine 09/16/2013, 10/01/2021    Influenza Vaccine (Quad) Mdck Pf (>2 Yrs Flucelvax QUAD [id number]) 10/09/2019    Influenza Vaccine Yokasta [place]) PF (>6 Mo Flulaval, Fluarix, and >3 Yrs Afluria, Fluzone [id number]) 09/28/2018    Pneumococcal Conjugate (PCV-13) 10/17/2020    Pneumococcal Polysaccharide (PPSV-23) 01/31/2022    Tdap 01/16/2005, 05/07/2014, 02/15/2016    Zoster Recombinant 12/04/2019, 06/03/2020    Zoster Vaccine, Live 01/02/2017       Past Medical History:   Diagnosis Date    Asthma     mild intermittent    Basal cell carcinoma     face, chest? Dr Missy Cervantes. saw Dr. Javy Lewis Chronic sinus bradycardia     Current mild episode of major depressive disorder without prior episode ([facility] [address] 9/7/2018    Generalized headaches     2008 MRI normal    Insomnia     normal sleep study 2008    Internal hemorrhoids     Low back pain     recurrent, sometimes w L leg numbness    Normal echocardiogram 1/2012    JUSTINO (obstructive sleep apnea) 04/16/2020    dx by [facility] home study. AHI 7.  20 apneas, 27 hypopneas. o2 low 80%    Pericarditis 2011    idiopathic    Plantar fasciitis of left foot     Dr. Paty Cade. tear 1/14/14. MRI 3/2014 showed tear    Radicular pain of right lower back 6/15/16    Rupture of rectus femoris tendon 06/2016    MRI 7/18/16      has a past surgical history that includes hx heart catheterization (2011); hx colonoscopy (3/22/07); and hx orthopaedic (Left). Percocet [oxycodone-acetaminophen] and Sulfa (sulfonamide antibiotics)   Current Outpatient Medications   Medication Sig    nystatin (MYCOSTATIN) topical cream Apply  to affected area two (2) times a day.  budesonide (PULMICORT) 180 mcg/actuation aepb inhaler Take 2 Puffs by inhalation daily.  albuterol (PROVENTIL HFA, VENTOLIN HFA, PROAIR HFA) 90 mcg/actuation inhaler Take 2 Puffs by inhalation every six (6) hours as needed for Wheezing.  montelukast (SINGULAIR) 10 mg tablet Take 1 Tab by mouth daily.  ibuprofen (MOTRIN) 200 mg tablet Take  by mouth every six (6) hours as needed for Pain.  omeprazole (PRILOSEC OTC) 20 mg tablet Take 20 mg by mouth daily. No current facility-administered medications for this visit. SOCIAL HX:  reports that he has never smoked. He has never used smokeless tobacco. He reports that he does not drink alcohol and does not use drugs.     FAMILY HX: family history includes Arthritis-rheumatoid in his sister; Cancer in his mother; Heart Disease in his father; SLE in his sister; Stroke in his father. Review of Systems - History obtained from the patient  General ROS: negative for - night sweats, weight gain or weight loss  Cardiovascular ROS: no chest pain, dyspnea on exertion, edema    Physical exam  Blood pressure 128/78, pulse (!) 51, temperature 98.5 °F (36.9 °C), temperature source Oral, resp. rate 15, height 5' 8\" (1.727 m), weight 205 lb (93 kg), SpO2 97 %. Wt Readings from Last 3 Encounters:   01/31/22 205 lb (93 kg)   08/27/20 205 lb (93 kg)   03/17/20 204 lb (92.5 kg)     He appears well, alert and oriented x 3, pleasant and cooperative. Vitals as noted. bilateral cerumen impactions  No rashes or significant lesions. Neck supple and free of adenopathy, or masses. No thyromegaly or carotid bruits. Cranial nerves normal. Lungs are clear to auscultation. Heart sounds are normal with no murmurs, clicks, gallops or rubs. Abdomen is soft, non- tender, with no masses or organomegaly. Extremities, peripheral pulses and reflexes are normal.  . RECTAL/PROSTATE EXAM: Erythema in gluteal cleft and small hemorrhoids noted. Smooth and symmetric without nodules or tenderness, enlarged 2+. Skin is without rashes or suspicious lesions. Assessment and Plan:    1. Preventative health care  Preventative services other than Pneumovax needed today. - PSA W/ REFLX FREE PSA  - METABOLIC PANEL, COMPREHENSIVE  - CBC W/O DIFF  - LIPID PANEL    2. Encounter for immunization  - PNEUMOCOCCAL POLYSACCHARIDE VACCINE, 23-VALENT, ADULT OR IMMUNOSUPPRESSED PT DOSE,  - MT IMMUNIZ ADMIN,1 SINGLE/COMB VAC/TOXOID    3. Blood pressure elevated without history of HTN  Blood pressure well controlled on no medications today. Review to monitor. 4. Anal pruritus  Very likely combination of hemorrhoids and Candida. Recommend keeping stools soft.   Using Preparation H wipes and then applying Preparation H ointment movements. Reassured. 5. Candidal intertrigo  Recommend nystatin topical cream as needed. 6. Ceruminosis, bilateral  Decreased hearing with bilateral significant cerumen impactions. Ceruminosis is noted. Wax is removed by syringing and manual debridement. Instructions for home care to prevent wax buildup are given.   - REMOVE IMPACTED EAR WAX      The patient is asked to make an attempt to improve diet and exercise patterns  Avoid tobacco products, excessive alcohol    Return for yearly wellness visits

## 2022-03-19 PROBLEM — G47.33 OSA (OBSTRUCTIVE SLEEP APNEA): Status: ACTIVE | Noted: 2020-04-16

## 2022-07-08 ENCOUNTER — VIRTUAL VISIT (OUTPATIENT)
Dept: INTERNAL MEDICINE CLINIC | Age: 67
End: 2022-07-08
Payer: COMMERCIAL

## 2022-07-08 DIAGNOSIS — S76.811D RUPTURE OF RIGHT RECTUS FEMORIS TENDON, SUBSEQUENT ENCOUNTER: Primary | ICD-10-CM

## 2022-07-08 DIAGNOSIS — G89.29 CHRONIC LOW BACK PAIN, UNSPECIFIED BACK PAIN LATERALITY, UNSPECIFIED WHETHER SCIATICA PRESENT: ICD-10-CM

## 2022-07-08 DIAGNOSIS — M54.50 CHRONIC LOW BACK PAIN, UNSPECIFIED BACK PAIN LATERALITY, UNSPECIFIED WHETHER SCIATICA PRESENT: ICD-10-CM

## 2022-07-08 PROCEDURE — 99213 OFFICE O/P EST LOW 20 MIN: CPT | Performed by: INTERNAL MEDICINE

## 2022-07-08 NOTE — PROGRESS NOTES
Consent: Bijal Saavedra, who was seen by synchronous (real-time) audio-video technology, and/or his healthcare decision maker, is aware that this patient-initiated, Telehealth encounter on 7/8/2022 is a billable service, with coverage as determined by his insurance carrier. He is aware that he may receive a bill and has provided verbal consent to proceed: Yes. 712  Subjective:   Bijal Saavedra is a 73 y.o. male who was seen for Follow-up (needs note, anytime after 1:00)    Is working at the [employer] as  and instructed. 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 He cannot work directly with prisoners. He is very concerned about his physical wellbeing participating in these drills. He does have a history of a rupture of his right rectus femoris tendon and does have chronic lower back pain. Symptoms of pain of his buttock and lower back are exacerbated by heavy lifting and rapid movements. He is able to function in his job as an  without any difficulty. Current Outpatient Medications   Medication Sig    nystatin (MYCOSTATIN) topical cream Apply  to affected area two (2) times a day.  budesonide (PULMICORT) 180 mcg/actuation aepb inhaler Take 2 Puffs by inhalation daily.  albuterol (PROVENTIL HFA, VENTOLIN HFA, PROAIR HFA) 90 mcg/actuation inhaler Take 2 Puffs by inhalation every six (6) hours as needed for Wheezing.  montelukast (SINGULAIR) 10 mg tablet Take 1 Tab by mouth daily.  ibuprofen (MOTRIN) 200 mg tablet Take  by mouth every six (6) hours as needed for Pain.  omeprazole (PRILOSEC OTC) 20 mg tablet Take 20 mg by mouth daily. (Patient not taking: Reported on 7/8/2022)     No current facility-administered medications for this visit.        Allergies   Allergen Reactions    Percocet [Oxycodone-Acetaminophen] Rash    Sulfa (Sulfonamide Antibiotics) Other (comments)       Past Medical History:   Diagnosis Date    Asthma     mild intermittent    Basal cell carcinoma     face, chest? Dr Jamia Davenport. saw Dr. Mya Willett Chronic sinus bradycardia     Current mild episode of major depressive disorder without prior episode ([email or web address] 9/7/2018    Generalized headaches     2008 MRI normal    Insomnia     normal sleep study 2008    Internal hemorrhoids     Low back pain     recurrent, sometimes w L leg numbness    Normal echocardiogram 1/2012    JUSTINO (obstructive sleep apnea) 04/16/2020    dx by [facility] study. AHI 7.  20 apneas, 27 hypopneas. o2 low 80%    Pericarditis 2011    idiopathic    Plantar fasciitis of left foot     Dr. John Das. tear 1/14/14. MRI 3/2014 showed tear    Radicular pain of right lower back 6/15/16    Rupture of rectus femoris tendon 06/2016    MRI 7/18/16       ROS  All other systems reviewed and negative, unless mentioned in HPI    Objective:   Vital Signs: (As obtained by patient/caregiver at home)  There were no vitals taken for this visit.      [INSTRUCTIONS:  \"[x]\" Indicates a positive item  \"[]\" Indicates a negative item  -- DELETE ALL ITEMS NOT EXAMINED]    Constitutional: [x] Appears well-developed and well-nourished [x] No apparent distress      [] Abnormal -     Mental status: [x] Alert and awake  [x] Oriented to person/place/time [x] Able to follow commands    [] Abnormal -     Eyes:   EOM    [x]  Normal    [] Abnormal -   Sclera  [x]  Normal    [] Abnormal -          Discharge [x]  None visible   [] Abnormal -     HENT: [x] Normocephalic, atraumatic  [] Abnormal -   [x] Mouth/Throat: Mucous membranes are moist    External Ears [x] Normal  [] Abnormal -    Neck: [x] No visualized mass [] Abnormal -     Pulmonary/Chest: [x] Respiratory effort normal   [x] No visualized signs of difficulty breathing or respiratory distress        [] Abnormal -      Musculoskeletal:   [x] Normal gait with no signs of ataxia         [x] Normal range of motion of neck        [] Abnormal -     Neurological:        [x] No Facial Asymmetry (Cranial nerve 7 motor function) (limited exam due to video visit)          [x] No gaze palsy        [] Abnormal -          Skin:        [x] No significant exanthematous lesions or discoloration noted on facial skin         [] Abnormal -            Psychiatric:       [x] Normal Affect [] Abnormal -        [x] No Hallucinations    Other pertinent observable physical exam findings:-        Assessment & Plan:   Diagnoses and all orders for this visit:    1. Rupture of right rectus femoris tendon, subsequent encounter  2. 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 He remains at significant risk of reinjuring his prior injuries and exacerbating regions that caused significant debilitating pain in the past.   He is able to complete his routine tasks without any accommodations or disability. Letter drafted. We discussed the expected course, resolution and complications of the diagnosis(es) in detail. Medication risks, benefits, costs, interactions, and alternatives were discussed as indicated. I advised him to contact the office if his condition worsens, changes or fails to improve as anticipated. He expressed understanding with the diagnosis(es) and plan. Bernabe Hoyt, was evaluated through a synchronous (real-time) audio-video encounter. The patient (or guardian if applicable) is aware that this is a billable service, which includes applicable co-pays. This Virtual Visit was conducted with patient's (and/or legal guardian's) consent. The visit was conducted pursuant to the emergency declaration under the [address] [place] [address] authority and the [employer] and [employer] Act.  Patient identification was verified, and a caregiver was present when appropriate. The patient was located in a state where the provider was licensed to provide care. Services were provided through a synchronous discussion virtually to substitute for in-person clinic visit. I was in the office. The patient was at home.      Valorie Camara MD

## 2022-07-08 NOTE — LETTER
7/8/2022     Re:  Mr. Katelyn [address] [place] [id number]  Date of birth 1955      Notification of health condition      To whom it may concern:    Mr. Juan C Morrison is a patient of my internal medicine primary care practice. 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 He has a history of physical medical conditions which predispose him to injury during intensive physical training. Therefore, I advise that he abstain from such training. He is otherwise in stable and good health and is able to fulfill the daily duties of his current occupation without any restrictions or limitations. Please let me know if you have any questions.       Sincerely,        Reinier Aleman MD

## 2022-08-05 ENCOUNTER — OFFICE VISIT (OUTPATIENT)
Dept: INTERNAL MEDICINE CLINIC | Age: 67
End: 2022-08-05
Payer: COMMERCIAL

## 2022-08-05 VITALS
SYSTOLIC BLOOD PRESSURE: 128 MMHG | OXYGEN SATURATION: 97 % | TEMPERATURE: 98.5 F | HEIGHT: 68 IN | RESPIRATION RATE: 16 BRPM | BODY MASS INDEX: 31.1 KG/M2 | DIASTOLIC BLOOD PRESSURE: 78 MMHG | WEIGHT: 205.2 LBS | HEART RATE: 50 BPM

## 2022-08-05 DIAGNOSIS — J45.20 MILD INTERMITTENT ASTHMA WITHOUT COMPLICATION: Primary | ICD-10-CM

## 2022-08-05 PROCEDURE — 99213 OFFICE O/P EST LOW 20 MIN: CPT | Performed by: INTERNAL MEDICINE

## 2022-08-05 RX ORDER — BUDESONIDE AND FORMOTEROL FUMARATE DIHYDRATE 160; 4.5 UG/1; UG/1
2 AEROSOL RESPIRATORY (INHALATION) 2 TIMES DAILY
Qty: 10.2 G | Refills: 4 | Status: SHIPPED | OUTPATIENT
Start: 2022-08-05

## 2022-08-05 RX ORDER — ZINC SULFATE 50(220)MG
1 CAPSULE ORAL DAILY
COMMUNITY

## 2022-08-05 RX ORDER — LANOLIN ALCOHOL/MO/W.PET/CERES
400 CREAM (GRAM) TOPICAL DAILY
COMMUNITY

## 2022-08-05 NOTE — PROGRESS NOTES
HISTORY OF PRESENT ILLNESS    Chief Complaint   Patient presents with    Asthma     Flare up - tightness - about 4 wks - gotten better. Other     Discuss full body scan at work. Presents for follow-up    Recent URI followed by asthma flare for 3 weeks. Feels s/s improved over the past 2 days. Took mucinex prn and takes Pulmicort, albuterol, flonase. Not taking singulair. Now can walk without issues. Asks about a full body scan at work when he walks in and out. Metal detector. He is concerned about the risk of it. Review of Systems   All other systems reviewed and are negative, except as noted in HPI    Past Medical and Surgical History   has a past medical history of Asthma, Basal cell carcinoma, Chronic sinus bradycardia, Current mild episode of major depressive disorder without prior episode ([facility] [address] (9/7/2018), Generalized headaches, Insomnia, Internal hemorrhoids, Low back pain, Normal echocardiogram (1/2012), JUSTINO (obstructive sleep apnea) (04/16/2020), Pericarditis (2011), Plantar fasciitis of left foot, Radicular pain of right lower back (6/15/16), and Rupture of rectus femoris tendon (06/2016). has a past surgical history that includes hx heart catheterization (2011); hx colonoscopy (3/22/07); hx orthopaedic (Left); and hx colonoscopy (09/22/2017). reports that he has never smoked. He has never used smokeless tobacco. He reports that he does not drink alcohol and does not use drugs. family history includes Arthritis-rheumatoid in his sister; Cancer in his mother; Heart Disease in his father; SLE in his sister; Stroke in his father. Physical Exam   Nursing note and vitals reviewed. Blood pressure 128/78, pulse (!) 50, temperature 98.5 °F (36.9 °C), temperature source Oral, resp. rate 16, height 5' 8\" (1.727 m), weight 205 lb 3.2 oz (93.1 kg), SpO2 97 %. Constitutional:  No distress.     Eyes: Conjunctivae are normal.   Ears:  Hearing grossly intact  Cardiovascular: Normal rate. regular rhythm, no murmurs or gallops  No edema  Pulmonary/Chest: Effort normal.  Scant wheezing bilaterally with deep exhalation. Musculoskeletal: moves all 4 extremities   Neurological: Alert and oriented to person, place, and time. Skin: No visible rash noted. Psychiatric: Normal mood and affect. Behavior is normal.     Assessment and Plan    Diagnoses and all orders for this visit:    1. Mild intermittent asthma without complication  Recently uncontrolled. He states that is improved over the past day, but he still does have some residual wheezing. Recommend escalating his controller therapy. Will change to Symbicort. Continue albuterol as needed. -     budesonide-formoteroL (SYMBICORT) 160-4.5 mcg/actuation HFAA; Take 2 Puffs by inhalation two (2) times a day. Indications: controller medication for asthma      lab results and schedule of future lab studies reviewed with patient  reviewed medications and side effects in detail    Return to clinic for further evaluation if new symptoms develop        Current Outpatient Medications   Medication Sig    POTASSIUM-99 PO Take 99 mg by mouth daily. zinc sulfate (Zinc-220) 50 mg zinc (220 mg) capsule Take 1 Capsule by mouth in the morning.    magnesium oxide (MAG-OX) 400 mg tablet Take 400 mg by mouth in the morning. budesonide-formoteroL (SYMBICORT) 160-4.5 mcg/actuation HFAA Take 2 Puffs by inhalation two (2) times a day. Indications: controller medication for asthma    nystatin (MYCOSTATIN) topical cream Apply  to affected area two (2) times a day. albuterol (PROVENTIL HFA, VENTOLIN HFA, PROAIR HFA) 90 mcg/actuation inhaler Take 2 Puffs by inhalation every six (6) hours as needed for Wheezing. ibuprofen (MOTRIN) 200 mg tablet Take  by mouth every six (6) hours as needed for Pain. No current facility-administered medications for this visit.

## 2023-02-03 ENCOUNTER — OFFICE VISIT (OUTPATIENT)
Dept: INTERNAL MEDICINE CLINIC | Age: 68
End: 2023-02-03
Payer: COMMERCIAL

## 2023-02-03 VITALS
OXYGEN SATURATION: 94 % | SYSTOLIC BLOOD PRESSURE: 132 MMHG | WEIGHT: 208.8 LBS | RESPIRATION RATE: 16 BRPM | HEIGHT: 68 IN | TEMPERATURE: 98.2 F | DIASTOLIC BLOOD PRESSURE: 82 MMHG | HEART RATE: 49 BPM | BODY MASS INDEX: 31.64 KG/M2

## 2023-02-03 DIAGNOSIS — J45.20 MILD INTERMITTENT ASTHMA WITHOUT COMPLICATION: ICD-10-CM

## 2023-02-03 DIAGNOSIS — N40.0 BENIGN LOCALIZED PROSTATIC HYPERPLASIA WITHOUT LOWER URINARY TRACT SYMPTOMS (LUTS): ICD-10-CM

## 2023-02-03 DIAGNOSIS — Z00.00 PREVENTATIVE HEALTH CARE: Primary | ICD-10-CM

## 2023-02-03 DIAGNOSIS — R25.2 LEG CRAMPING: ICD-10-CM

## 2023-02-03 RX ORDER — MAG HYDROX/ALUMINUM HYD/SIMETH 400-400-40
SUSPENSION, ORAL (FINAL DOSE FORM) ORAL
Qty: 30 CAPSULE | Refills: 5
Start: 2023-02-03

## 2023-02-03 RX ORDER — ALBUTEROL SULFATE 90 UG/1
2 AEROSOL, METERED RESPIRATORY (INHALATION)
Qty: 1 EACH | Refills: 5 | Status: SHIPPED | OUTPATIENT
Start: 2023-02-03

## 2023-02-03 NOTE — PROGRESS NOTES
Luis Alberto Haines is a 78 y.o. male  Presenting for his annual checkup and health maintenance review and follow-up    Leg cramps at night. Improves w stretching. Colonoscopy 2017 showed hemorrhoids only. Borderline hypertension. Taking no medications for blood pressure. Asthma. Taking Symbiicort, albuterol as needed. Reports recent control of symptoms occasional SOB and mild wheezing. BPH. Reports urinary hesitancy, urgency at times. 2x nocturia. Enlarged prostate 1/2022.   Lab Results   Component Value Date/Time    Prostate Specific Ag 0.2 01/31/2022 10:04 AM    Prostate Specific Ag 0.3 03/04/2020 02:11 PM    Prostate Specific Ag 0.3 01/11/2019 08:48 AM       Exercise: moderately active  Diet: generally follows a low fat low cholesterol diet  Health Maintenance   Topic Date Due    COVID-19 Vaccine (4 - Booster for Moderna series) 03/24/2022    Flu Vaccine (1) 08/01/2022    Depression Screen  08/05/2023    DTaP/Tdap/Td series (4 - Td or Tdap) 02/15/2026    Lipid Screen  01/31/2027    Colorectal Cancer Screening Combo  09/22/2027    Hepatitis C Screening  Completed    Shingles Vaccine  Completed    Pneumococcal 65+ years  Completed     Health Maintenance reviewed  Last digital rectal exam:  none  Lab Results   Component Value Date/Time    Prostate Specific Ag 0.2 01/31/2022 10:04 AM    Prostate Specific Ag 0.3 03/04/2020 02:11 PM    Prostate Specific Ag 0.3 01/11/2019 08:48 AM       Vaccinations reviewed  Immunization History   Administered Date(s) Administered    COVID-19, MODERNA BLUE border, Primary or Immunocompromised, (age 18y+), IM, 100 mcg/0.5mL 04/15/2021, 05/13/2021    COVID-19, MODERNA Booster BLUE border, (age 18y+), IM, 50mcg/0.25mL 01/27/2022    Influenza Vaccine 09/16/2013, 10/01/2021    Influenza, FLUARIX, FLULAVAL, FLUZONE (age 10 mo+) AND AFLURIA, (age 1 y+), PF, 0.5mL 09/28/2018    Influenza, FLUCELVAX, (age 10 mo+), MDCK, PF 10/09/2019    Pneumococcal Conjugate (PCV-13) 10/17/2020    Pneumococcal Polysaccharide (PPSV-23) 01/31/2022    Tdap 01/16/2005, 05/07/2014, 02/15/2016    Zoster Recombinant 12/04/2019, 06/03/2020    Zoster Vaccine, Live 01/02/2017       Past Medical History:   Diagnosis Date    Asthma     mild intermittent    Basal cell carcinoma     face, chest? Dr Darian Chung. saw Dr. Miya Harris    Chronic sinus bradycardia     [facility]2018    Generalized headaches     2008 MRI normal    Insomnia     normal sleep study 2008    Internal hemorrhoids     Low back pain     recurrent, sometimes w L leg numbness    Normal echocardiogram 1/2012    JUSTINO (obstructive sleep apnea) 04/16/2020    dx by [facility] home study. AHI 7.  20 apneas, 27 hypopneas. o2 low 80%    Pericarditis 2011    idiopathic    Plantar fasciitis of left foot     Dr. Humberto Kaba. tear 1/14/14. MRI 3/2014 showed tear    Radicular pain of right lower back 6/15/16    Rupture of rectus femoris tendon 06/2016    MRI 7/18/16      has a past surgical history that includes hx heart catheterization (2011); hx colonoscopy (3/22/07); hx orthopaedic (Left); and hx colonoscopy (09/22/2017). Percocet [oxycodone-acetaminophen] and Sulfa (sulfonamide antibiotics)   Current Outpatient Medications   Medication Sig    budesonide-formoteroL (SYMBICORT) 160-4.5 mcg/actuation HFAA Take 2 Puffs by inhalation two (2) times a day. Indications: controller medication for asthma    nystatin (MYCOSTATIN) topical cream Apply  to affected area two (2) times a day. albuterol (PROVENTIL HFA, VENTOLIN HFA, PROAIR HFA) 90 mcg/actuation inhaler Take 2 Puffs by inhalation every six (6) hours as needed for Wheezing. ibuprofen (MOTRIN) 200 mg tablet Take  by mouth every six (6) hours as needed for Pain. No current facility-administered medications for this visit. SOCIAL HX:  reports that he has never smoked.  He has never used smokeless tobacco. He reports that he does not drink alcohol and does not use drugs. FAMILY HX: family history includes Arthritis-rheumatoid in his sister; Cancer in his mother; Heart Disease in his father; SLE in his sister; Stroke in his father. Review of Systems - History obtained from the patient  General ROS: negative for - night sweats, weight gain or weight loss  Cardiovascular ROS: no chest pain, dyspnea on exertion, edema    Physical exam  Blood pressure 132/82, pulse (!) 49, temperature 98.2 °F (36.8 °C), temperature source Oral, resp. rate 16, height 5' 8\" (1.727 m), weight 208 lb 12.8 oz (94.7 kg), SpO2 94 %. Wt Readings from Last 3 Encounters:   02/03/23 208 lb 12.8 oz (94.7 kg)   08/05/22 205 lb 3.2 oz (93.1 kg)   01/31/22 205 lb (93 kg)     He appears well, alert and oriented x 3, pleasant and cooperative. Vitals as noted. bilateral cerumen impactions  No rashes or significant lesions. Neck supple and free of adenopathy, or masses. No thyromegaly or carotid bruits. Cranial nerves normal. Lungs are clear to auscultation. Heart sounds are normal with no murmurs, clicks, gallops or rubs. Abdomen is soft, non- tender, with no masses or organomegaly. Extremities, peripheral pulses and reflexes are normal.  . RECTAL/PROSTATE EXAM: deferred   Skin is without rashes or suspicious lesions. Diagnoses and all orders for this visit:    1. Preventative health care  Preventative labs ordered. -     PSA W/ REFLX FREE PSA; Future  -     METABOLIC PANEL, COMPREHENSIVE; Future  -     CBC W/O DIFF; Future  -     LIPID PANEL; Future    2. Mild intermittent asthma without complication  Reasonly well controlled with Symbicort controller and as needed albuterol.  -     albuterol (PROVENTIL HFA, VENTOLIN HFA, PROAIR HFA) 90 mcg/actuation inhaler; Take 2 Puffs by inhalation every six (6) hours as needed for Wheezing. 3. Benign localized prostatic hyperplasia without lower urinary tract symptoms (LUTS)  Uncontrolled hesitancy, incomplete emptying. Nocturia. Check PSA, urinalysis. Discussed treatment options including herbal therapy such as saw palmetto, tamsulosin, finasteride. He prefers to not take a prescription medication at this time. Trial of saw palmetto. Recommend ANNAMARIE if any concerns or symptoms or not improving or if any significant increase in PSA. -     saw palmetto 450 mg cap; Take 1 daily for prostate  -     URINALYSIS W/ RFLX MICROSCOPIC; Future    4. Leg cramping  Uncontrolled. Stretching exercises demonstrated for for this condition  Check electrolytes.     The patient is asked to make an attempt to improve diet and exercise patterns  Avoid tobacco products, excessive alcohol    Return for yearly wellness visits

## 2023-02-03 NOTE — PROGRESS NOTES
HISTORY OF PRESENT ILLNESS    Chief Complaint   Patient presents with    Medication Refill       Presents for follow-up      Review of Systems   All other systems reviewed and are negative, except as noted in HPI    Past Medical and Surgical History   has a past medical history of Asthma, Basal cell carcinoma, Chronic sinus bradycardia, Current mild episode of major depressive disorder without prior episode ([facility] [address] (9/7/2018), Generalized headaches, Insomnia, Internal hemorrhoids, Low back pain, Normal echocardiogram (1/2012), JUSTINO (obstructive sleep apnea) (04/16/2020), Pericarditis (2011), Plantar fasciitis of left foot, Radicular pain of right lower back (6/15/16), and Rupture of rectus femoris tendon (06/2016). has a past surgical history that includes hx heart catheterization (2011); hx colonoscopy (3/22/07); hx orthopaedic (Left); and hx colonoscopy (09/22/2017). reports that he has never smoked. He has never used smokeless tobacco. He reports that he does not drink alcohol and does not use drugs. family history includes Arthritis-rheumatoid in his sister; Cancer in his mother; Heart Disease in his father; SLE in his sister; Stroke in his father. Physical Exam   Nursing note and vitals reviewed. Blood pressure (!) 151/83, pulse (!) 49, temperature 98.2 °F (36.8 °C), temperature source Oral, resp. rate 16, height 5' 8\" (1.727 m), weight 208 lb 12.8 oz (94.7 kg), SpO2 94 %. Constitutional:  No distress. Eyes: Conjunctivae are normal.   Ears:  Hearing grossly intact  Cardiovascular: Normal rate. regular rhythm, no murmurs or gallops  No edema  Pulmonary/Chest: Effort normal.  Scant wheezing bilaterally with deep exhalation. Musculoskeletal: moves all 4 extremities   Neurological: Alert and oriented to person, place, and time. Skin: No visible rash noted. Psychiatric: Normal mood and affect. Behavior is normal.     Diagnoses and all orders for this visit:    1.  Mild intermittent asthma without complication          lab results and schedule of future lab studies reviewed with patient  reviewed medications and side effects in detail    Return to clinic for further evaluation if new symptoms develop        Current Outpatient Medications   Medication Sig    budesonide-formoteroL (SYMBICORT) 160-4.5 mcg/actuation HFAA Take 2 Puffs by inhalation two (2) times a day. Indications: controller medication for asthma    nystatin (MYCOSTATIN) topical cream Apply  to affected area two (2) times a day. albuterol (PROVENTIL HFA, VENTOLIN HFA, PROAIR HFA) 90 mcg/actuation inhaler Take 2 Puffs by inhalation every six (6) hours as needed for Wheezing. ibuprofen (MOTRIN) 200 mg tablet Take  by mouth every six (6) hours as needed for Pain. No current facility-administered medications for this visit.

## 2023-02-04 LAB
ALBUMIN SERPL-MCNC: 4.3 G/DL (ref 3.5–5)
ALBUMIN/GLOB SERPL: 1.2 (ref 1.1–2.2)
ALP SERPL-CCNC: 62 U/L (ref 45–117)
ALT SERPL-CCNC: 35 U/L (ref 12–78)
ANION GAP SERPL CALC-SCNC: 5 MMOL/L (ref 5–15)
APPEARANCE UR: ABNORMAL
AST SERPL-CCNC: 13 U/L (ref 15–37)
BILIRUB SERPL-MCNC: 0.5 MG/DL (ref 0.2–1)
BILIRUB UR QL: NEGATIVE
BUN SERPL-MCNC: 16 MG/DL (ref 6–20)
BUN/CREAT SERPL: 14 (ref 12–20)
CALCIUM SERPL-MCNC: 9.1 MG/DL (ref 8.5–10.1)
CHLORIDE SERPL-SCNC: 106 MMOL/L (ref 97–108)
CHOLEST SERPL-MCNC: 246 MG/DL
CO2 SERPL-SCNC: 29 MMOL/L (ref 21–32)
COLOR UR: ABNORMAL
CREAT SERPL-MCNC: 1.13 MG/DL (ref 0.7–1.3)
ERYTHROCYTE [DISTWIDTH] IN BLOOD BY AUTOMATED COUNT: 11.8 % (ref 11.5–14.5)
GLOBULIN SER CALC-MCNC: 3.7 G/DL (ref 2–4)
GLUCOSE SERPL-MCNC: 94 MG/DL (ref 65–100)
GLUCOSE UR STRIP.AUTO-MCNC: NEGATIVE MG/DL
HCT VFR BLD AUTO: 49.3 % (ref 36.6–50.3)
HDLC SERPL-MCNC: 68 MG/DL
HDLC SERPL: 3.6 (ref 0–5)
HGB BLD-MCNC: 16.8 G/DL (ref 12.1–17)
HGB UR QL STRIP: NEGATIVE
KETONES UR QL STRIP.AUTO: NEGATIVE MG/DL
LDLC SERPL CALC-MCNC: 156 MG/DL (ref 0–100)
LEUKOCYTE ESTERASE UR QL STRIP.AUTO: NEGATIVE
MCH RBC QN AUTO: 31.6 PG (ref 26–34)
MCHC RBC AUTO-ENTMCNC: 34.1 G/DL (ref 30–36.5)
MCV RBC AUTO: 92.7 FL (ref 80–99)
NITRITE UR QL STRIP.AUTO: NEGATIVE
NRBC # BLD: 0 K/UL (ref 0–0.01)
NRBC BLD-RTO: 0 PER 100 WBC
PH UR STRIP: 8 (ref 5–8)
PLATELET # BLD AUTO: 250 K/UL (ref 150–400)
PMV BLD AUTO: 11.2 FL (ref 8.9–12.9)
POTASSIUM SERPL-SCNC: 4.5 MMOL/L (ref 3.5–5.1)
PROT SERPL-MCNC: 8 G/DL (ref 6.4–8.2)
PROT UR STRIP-MCNC: NEGATIVE MG/DL
RBC # BLD AUTO: 5.32 M/UL (ref 4.1–5.7)
SODIUM SERPL-SCNC: 140 MMOL/L (ref 136–145)
SP GR UR REFRACTOMETRY: 1.02 (ref 1–1.03)
TRIGL SERPL-MCNC: 110 MG/DL (ref ?–150)
UROBILINOGEN UR QL STRIP.AUTO: 0.2 EU/DL (ref 0.2–1)
VLDLC SERPL CALC-MCNC: 22 MG/DL
WBC # BLD AUTO: 6.7 K/UL (ref 4.1–11.1)

## 2023-02-05 LAB
PSA SERPL-MCNC: 0.3 NG/ML (ref 0–4)
REFLEX CRITERIA: NORMAL

## 2023-02-16 ENCOUNTER — HOSPITAL ENCOUNTER (OUTPATIENT)
Dept: GENERAL RADIOLOGY | Age: 68
Discharge: HOME | End: 2023-02-16
Payer: COMMERCIAL

## 2023-02-16 ENCOUNTER — TRANSCRIBE ORDER (OUTPATIENT)
Dept: REGISTRATION | Age: 68
End: 2023-02-16

## 2023-02-16 DIAGNOSIS — R06.02 SHORTNESS OF BREATH: Primary | ICD-10-CM

## 2023-02-16 DIAGNOSIS — R06.02 SHORTNESS OF BREATH: ICD-10-CM

## 2023-02-16 PROCEDURE — 71046 X-RAY EXAM CHEST 2 VIEWS: CPT

## 2023-03-24 ENCOUNTER — TRANSCRIBE ORDER (OUTPATIENT)
Dept: SCHEDULING | Age: 68
End: 2023-03-24

## 2023-03-24 DIAGNOSIS — R91.8 LUNG INFILTRATE: Primary | ICD-10-CM

## 2023-03-31 ENCOUNTER — HOSPITAL ENCOUNTER (OUTPATIENT)
Dept: CT IMAGING | Age: 68
Discharge: HOME | End: 2023-03-31
Attending: INTERNAL MEDICINE
Payer: COMMERCIAL

## 2023-03-31 DIAGNOSIS — R91.8 LUNG INFILTRATE: ICD-10-CM

## 2023-03-31 PROCEDURE — 74011000636 HC RX REV CODE- 636: Performed by: INTERNAL MEDICINE

## 2023-03-31 PROCEDURE — 71260 CT THORAX DX C+: CPT

## 2023-03-31 RX ADMIN — IOPAMIDOL 80 ML: 755 INJECTION, SOLUTION INTRAVENOUS at 16:16

## 2024-03-11 ENCOUNTER — OFFICE VISIT (OUTPATIENT)
Age: 69
End: 2024-03-11
Payer: MEDICARE

## 2024-03-11 VITALS
RESPIRATION RATE: 16 BRPM | DIASTOLIC BLOOD PRESSURE: 70 MMHG | HEIGHT: 68 IN | HEART RATE: 51 BPM | BODY MASS INDEX: 31.52 KG/M2 | SYSTOLIC BLOOD PRESSURE: 137 MMHG | OXYGEN SATURATION: 96 % | WEIGHT: 208 LBS | TEMPERATURE: 98.2 F

## 2024-03-11 DIAGNOSIS — N63.24 MASS OF LOWER INNER QUADRANT OF LEFT BREAST: Primary | ICD-10-CM

## 2024-03-11 PROCEDURE — 1123F ACP DISCUSS/DSCN MKR DOCD: CPT | Performed by: NURSE PRACTITIONER

## 2024-03-11 PROCEDURE — G8484 FLU IMMUNIZE NO ADMIN: HCPCS | Performed by: NURSE PRACTITIONER

## 2024-03-11 PROCEDURE — 99214 OFFICE O/P EST MOD 30 MIN: CPT | Performed by: NURSE PRACTITIONER

## 2024-03-11 PROCEDURE — G8417 CALC BMI ABV UP PARAM F/U: HCPCS | Performed by: NURSE PRACTITIONER

## 2024-03-11 PROCEDURE — G8427 DOCREV CUR MEDS BY ELIG CLIN: HCPCS | Performed by: NURSE PRACTITIONER

## 2024-03-11 PROCEDURE — 3017F COLORECTAL CA SCREEN DOC REV: CPT | Performed by: NURSE PRACTITIONER

## 2024-03-11 PROCEDURE — 1036F TOBACCO NON-USER: CPT | Performed by: NURSE PRACTITIONER

## 2024-03-11 SDOH — ECONOMIC STABILITY: FOOD INSECURITY: WITHIN THE PAST 12 MONTHS, YOU WORRIED THAT YOUR FOOD WOULD RUN OUT BEFORE YOU GOT MONEY TO BUY MORE.: NEVER TRUE

## 2024-03-11 SDOH — ECONOMIC STABILITY: INCOME INSECURITY: HOW HARD IS IT FOR YOU TO PAY FOR THE VERY BASICS LIKE FOOD, HOUSING, MEDICAL CARE, AND HEATING?: NOT HARD AT ALL

## 2024-03-11 SDOH — ECONOMIC STABILITY: HOUSING INSECURITY
IN THE LAST 12 MONTHS, WAS THERE A TIME WHEN YOU DID NOT HAVE A STEADY PLACE TO SLEEP OR SLEPT IN A SHELTER (INCLUDING NOW)?: NO

## 2024-03-11 SDOH — ECONOMIC STABILITY: FOOD INSECURITY: WITHIN THE PAST 12 MONTHS, THE FOOD YOU BOUGHT JUST DIDN'T LAST AND YOU DIDN'T HAVE MONEY TO GET MORE.: NEVER TRUE

## 2024-03-11 ASSESSMENT — ENCOUNTER SYMPTOMS
CHEST TIGHTNESS: 0
EYE REDNESS: 0
CONSTIPATION: 0
COUGH: 0
BLOOD IN STOOL: 0
ABDOMINAL PAIN: 0
SHORTNESS OF BREATH: 0
BACK PAIN: 0
RESPIRATORY NEGATIVE: 1
VOMITING: 0
DIARRHEA: 0
NAUSEA: 0
SINUS PRESSURE: 0
RHINORRHEA: 0
EYES NEGATIVE: 1
COLOR CHANGE: 0
EYE PAIN: 0
GASTROINTESTINAL NEGATIVE: 1
SINUS PAIN: 0

## 2024-03-11 ASSESSMENT — PATIENT HEALTH QUESTIONNAIRE - PHQ9
SUM OF ALL RESPONSES TO PHQ QUESTIONS 1-9: 0
2. FEELING DOWN, DEPRESSED OR HOPELESS: 0
SUM OF ALL RESPONSES TO PHQ9 QUESTIONS 1 & 2: 0
SUM OF ALL RESPONSES TO PHQ QUESTIONS 1-9: 0
1. LITTLE INTEREST OR PLEASURE IN DOING THINGS: 0
SUM OF ALL RESPONSES TO PHQ QUESTIONS 1-9: 0
SUM OF ALL RESPONSES TO PHQ QUESTIONS 1-9: 0

## 2024-03-11 NOTE — PROGRESS NOTES
Assessment and Plan     1. Mass of lower inner quadrant of left breast: Diagnostic breast imaging studies ordered. Monthly self breast exam recommended. Return instructions given. Pt verbalized understanding.   -     US BREAST COMPLETE LEFT; Future  -     [facility] DIGITAL DIAGNOSTIC W OR WO CAD LEFT; Future       Benefits, risks, possible drug interactions, and side effects of all new medications were reviewed with the patient. Pt verbalized understanding.    An electronic signature was used to authenticate this note.  Nanci Saavedra, APRN - CNP  3/11/2024      Follow-up and Dispositions    Return if symptoms worsen or fail to improve.          History of Present Illness   Chief Complaint     Rasta Gutierrez is a 68 y.o. male here for had concerns including Mass.   Mr. Gutiererz presents today with reports to lump to L breast area, noted a week ago. Positive for tenderness to area. Admits tenderness resolved but he continues to feel a small lump. Has not taking medications for pain. Denies nipple discharge or skin changes. Denies family history of breast cancer.    Review of Systems  Review of Systems   Constitutional: Negative.  Negative for chills, fatigue, fever and unexpected weight change.   HENT: Negative.  Negative for congestion, rhinorrhea, sinus pressure and sinus pain.    Eyes: Negative.  Negative for pain, redness and visual disturbance.   Respiratory: Negative.  Negative for cough, chest tightness and shortness of breath.    Cardiovascular: Negative.  Negative for chest pain and palpitations.   Gastrointestinal: Negative.  Negative for abdominal pain, blood in stool, constipation, diarrhea, nausea and vomiting.   Endocrine: Negative.  Negative for polydipsia, polyphagia and polyuria.   Genitourinary: Negative.  Negative for difficulty urinating, dysuria, frequency and urgency.   Musculoskeletal: Negative.  Negative for back pain.   Skin:  Negative for color change, pallor, rash and wound.        L

## 2024-03-27 ENCOUNTER — HOSPITAL ENCOUNTER (OUTPATIENT)
Facility: HOSPITAL | Age: 69
Setting detail: RECURRING SERIES
Discharge: HOME | End: 2024-03-30
Payer: MEDICARE

## 2024-03-27 PROCEDURE — 97012 MECHANICAL TRACTION THERAPY: CPT

## 2024-03-27 PROCEDURE — 97161 PT EVAL LOW COMPLEX 20 MIN: CPT

## 2024-03-27 NOTE — THERAPY EVALUATION
OBJECTIVE    Posture; forward head.  Supine w/o pillow head forward  Shrugs, dps, neck mmt, neck aarom wnl  Hand  wnl 82 lb bilat.  Aarom wrists; slightly tight right wrist flex/ext'n  Supine prom shoulder     flex   abd'n   ER    IR                           RIGHT    150   138     95      45                            LEFT      157   140    75      50  MMT RTCs WNL    Objective/Functional Outcome Measure: 0%  AM-PAC  AM-PAC score = an established functional score where 0% = no disability       45 min [x]Eval - untimed                        15 MIN. INTERMITTENT CERVICAL TRACTION 25/2 LB 30 SEC ON/3 OFF FOR 15 MIN. SUPINE      [x]  Patient Education billed concurrently with other procedures   [x] Review HEP    [] Progressed/Changed HEP, detail:    [] Other detail:         Pain Level at end of session (0-10 scale): 0    Plan of Care / Statement of Necessity for Physical Therapy Services     Assessment / key information:  FORWARD HEAD. RIGHT UE RADICULAR ACHE. TIGHT SHOULDERS.     Evaluation Complexity:  History:  MEDIUM  Complexity : 1-2 comorbidities / personal factors will impact the outcome/ POC ; Examination:  LOW Complexity : 1-2 Standardized tests and measures addressing body structure, function, activity limitation and / or participation in recreation  ;Presentation:  LOW Complexity : Stable, uncomplicated  ;Clinical Decision Making:  LOW Complexity : FOTO score of  Overall Complexity Rating: LOW   Problem List: pain affecting function, decrease ROM, decrease ADL/functional abilities, decrease activity tolerance, and decrease flexibility/joint mobility   Treatment Plan may include any combination of the followin Therapeutic Exercise, 15470 Neuromuscular Re-Education, 25367 Manual Therapy, and 50018 Mechanical Traction  Patient / Family readiness to learn indicated by: asking questions  Persons(s) to be included in education: patient (P)  Barriers to Learning/Limitations:

## 2024-03-28 ENCOUNTER — HOSPITAL ENCOUNTER (OUTPATIENT)
Facility: HOSPITAL | Age: 69
Setting detail: RECURRING SERIES
Discharge: HOME | End: 2024-03-31
Payer: MEDICARE

## 2024-03-28 PROCEDURE — 97140 MANUAL THERAPY 1/> REGIONS: CPT

## 2024-03-28 PROCEDURE — 97110 THERAPEUTIC EXERCISES: CPT

## 2024-03-28 NOTE — PROGRESS NOTES
PHYSICAL THERAPY - DAILY TREATMENT NOTE (updated 3/23)      Date: 3/28/2024          Patient Name:  Rasta Gutierrez :  1955   Medical   Diagnosis:  Rotator cuff tendinitis, right [M75.81] Treatment Diagnosis:  M25.511  RIGHT SHOULDER PAIN    Referral Source:  Arlene Clark MD Insurance:   Payor: MEDICARE / Plan: MEDICARE PART A AND B / Product Type: *No Product type* /                     Patient  verified yes     Visit #   Current  / Total 2 20   Time   In / Out 930 1015   Total Treatment Time 45   Total Timed Codes 43         SUBJECTIVE    Pain Level (0-10 scale): 1-2    Any medication changes, allergies to medications, adverse drug reactions, diagnosis change, or new procedure performed?: [x] No    [] Yes (see summary sheet for update)  Medications: Verified on Patient Summary List    Subjective functional status/changes:     Pt reports feeling pretty good coming to PT today.     OBJECTIVE      Therapeutic Procedures:  Tx Min Billable or 1:1 Min (if diff from Tx Min) Procedure, Rationale, Specifics   28  57293 Therapeutic Exercise (timed):  increase ROM, strength, coordination, balance, and proprioception to improve patient's ability to progress to PLOF and address remaining functional goals. (see flow sheet as applicable)     Details if applicable:     15  37176 Manual Therapy (timed):  decrease pain, increase ROM, increase tissue extensibility, decrease edema, and decrease trigger points to improve patient's ability to progress to PLOF and address remaining functional goals.  The manual therapy interventions were performed at a separate and distinct time from the therapeutic activities interventions . (see flow sheet as applicable)     Details if applicable:  STM to R UT and cervical paraspinal muscle          Details if applicable:           Details if applicable:            Details if applicable:     43     Total Total         [x]  Patient Education billed concurrently with other procedures

## 2024-03-29 ENCOUNTER — HOSPITAL ENCOUNTER (OUTPATIENT)
Facility: HOSPITAL | Age: 69
End: 2024-03-29
Payer: MEDICARE

## 2024-03-29 DIAGNOSIS — N63.24 MASS OF LOWER INNER QUADRANT OF LEFT BREAST: ICD-10-CM

## 2024-03-29 PROCEDURE — 77066 DX MAMMO INCL CAD BI: CPT

## 2024-04-02 ENCOUNTER — HOSPITAL ENCOUNTER (OUTPATIENT)
Facility: HOSPITAL | Age: 69
Setting detail: RECURRING SERIES
Discharge: HOME | End: 2024-04-05
Payer: MEDICARE

## 2024-04-02 PROCEDURE — 97140 MANUAL THERAPY 1/> REGIONS: CPT

## 2024-04-02 PROCEDURE — 97110 THERAPEUTIC EXERCISES: CPT

## 2024-04-02 NOTE — PROGRESS NOTES
PHYSICAL THERAPY - DAILY TREATMENT NOTE (updated 3/23)      Date: 2024          Patient Name:  Rasta Gutierrez :  1955   Medical   Diagnosis:  Rotator cuff tendinitis, right [M75.81] Treatment Diagnosis:  M25.511  RIGHT SHOULDER PAIN    Referral Source:  Arlene Clark MD Insurance:   Payor: MEDICARE / Plan: MEDICARE PART A AND B / Product Type: *No Product type* /                     Patient  verified yes     Visit #   Current  / Total 3 20   Time   In / Out 930 1015   Total Treatment Time 45   Total Timed Codes 40         SUBJECTIVE    Pain Level (0-10 scale): 1-2    Any medication changes, allergies to medications, adverse drug reactions, diagnosis change, or new procedure performed?: [x] No    [] Yes (see summary sheet for update)  Medications: Verified on Patient Summary List    Subjective functional status/changes:     Pt reports feeling pretty good coming to PT today.     OBJECTIVE      Therapeutic Procedures:  Tx Min Billable or 1:1 Min (if diff from Tx Min) Procedure, Rationale, Specifics   30  40048 Therapeutic Exercise (timed):  increase ROM, strength, coordination, balance, and proprioception to improve patient's ability to progress to PLOF and address remaining functional goals. (see flow sheet as applicable)     Details if applicable:     10  56498 Manual Therapy (timed):  decrease pain, increase ROM, increase tissue extensibility, decrease edema, and decrease trigger points to improve patient's ability to progress to PLOF and address remaining functional goals.  The manual therapy interventions were performed at a separate and distinct time from the therapeutic activities interventions . (see flow sheet as applicable)     Details if applicable:  STM to R UT and cervical paraspinal muscle          Details if applicable:           Details if applicable:            Details if applicable:     40     Total Total         [x]  Patient Education billed concurrently with other procedures

## 2024-04-04 ENCOUNTER — APPOINTMENT (OUTPATIENT)
Facility: HOSPITAL | Age: 69
End: 2024-04-04
Payer: MEDICARE

## 2024-04-05 ENCOUNTER — HOSPITAL ENCOUNTER (OUTPATIENT)
Facility: HOSPITAL | Age: 69
Setting detail: RECURRING SERIES
Discharge: HOME | End: 2024-04-08
Payer: MEDICARE

## 2024-04-05 PROCEDURE — 97110 THERAPEUTIC EXERCISES: CPT

## 2024-04-05 PROCEDURE — 97012 MECHANICAL TRACTION THERAPY: CPT

## 2024-04-05 NOTE — PROGRESS NOTES
PHYSICAL THERAPY - DAILY TREATMENT NOTE (updated 3/23)      Date: 2024          Patient Name:  Rasta Gutierrez :  1955   Medical   Diagnosis:  Rotator cuff tendinitis, right [M75.81] Treatment Diagnosis:  M25.511  RIGHT SHOULDER PAIN    Referral Source:  Arlene Clark MD Insurance:   Payor: MEDICARE / Plan: MEDICARE PART A AND B / Product Type: *No Product type* /                     Patient  verified yes     Visit #   Current  / Total 4 20   Time   In / Out 145 235   Total Treatment Time 50   Total Timed Codes 43         SUBJECTIVE    Pain Level (0-10 scale): 1-2    Any medication changes, allergies to medications, adverse drug reactions, diagnosis change, or new procedure performed?: [x] No    [] Yes (see summary sheet for update)  Medications: Verified on Patient Summary List    Subjective functional status/changes:     Muscles feel stiff first thing in the morning.     OBJECTIVE      Therapeutic Procedures:  Tx Min Billable or 1:1 Min (if diff from Tx Min) Procedure, Rationale, Specifics   28  71945 Therapeutic Exercise (timed):  increase ROM, strength, coordination, balance, and proprioception to improve patient's ability to progress to PLOF and address remaining functional goals. (see flow sheet as applicable)     Details if applicable:       88706 Manual Therapy (timed):  decrease pain, increase ROM, increase tissue extensibility, decrease edema, and decrease trigger points to improve patient's ability to progress to PLOF and address remaining functional goals.  The manual therapy interventions were performed at a separate and distinct time from the therapeutic activities interventions . (see flow sheet as applicable)     Details if applicable:  STM to R UT and cervical paraspinal muscle          Details if applicable:           Details if applicable:            Details if applicable:     28     Total Total     Modalities Rationale:     decrease pain and increase tissue extensibility to

## 2024-04-09 ENCOUNTER — APPOINTMENT (OUTPATIENT)
Facility: HOSPITAL | Age: 69
End: 2024-04-09
Payer: MEDICARE

## 2024-04-11 ENCOUNTER — APPOINTMENT (OUTPATIENT)
Facility: HOSPITAL | Age: 69
End: 2024-04-11
Payer: MEDICARE

## 2024-04-15 ENCOUNTER — HOSPITAL ENCOUNTER (OUTPATIENT)
Facility: HOSPITAL | Age: 69
Setting detail: RECURRING SERIES
Discharge: HOME | End: 2024-04-18
Payer: MEDICARE

## 2024-04-15 PROCEDURE — 97110 THERAPEUTIC EXERCISES: CPT

## 2024-04-15 PROCEDURE — 97012 MECHANICAL TRACTION THERAPY: CPT

## 2024-04-16 ENCOUNTER — OFFICE VISIT (OUTPATIENT)
Age: 69
End: 2024-04-16
Payer: MEDICARE

## 2024-04-16 VITALS
SYSTOLIC BLOOD PRESSURE: 152 MMHG | RESPIRATION RATE: 16 BRPM | OXYGEN SATURATION: 97 % | TEMPERATURE: 98.2 F | WEIGHT: 206 LBS | BODY MASS INDEX: 30.51 KG/M2 | HEIGHT: 69 IN | DIASTOLIC BLOOD PRESSURE: 84 MMHG | HEART RATE: 55 BPM

## 2024-04-16 DIAGNOSIS — Z12.11 SCREENING FOR COLON CANCER: ICD-10-CM

## 2024-04-16 DIAGNOSIS — R03.0 ELEVATED BLOOD PRESSURE READING WITHOUT DIAGNOSIS OF HYPERTENSION: ICD-10-CM

## 2024-04-16 DIAGNOSIS — E66.09 CLASS 1 OBESITY DUE TO EXCESS CALORIES WITHOUT SERIOUS COMORBIDITY WITH BODY MASS INDEX (BMI) OF 30.0 TO 30.9 IN ADULT: ICD-10-CM

## 2024-04-16 DIAGNOSIS — E78.00 HYPERCHOLESTEROLEMIA: ICD-10-CM

## 2024-04-16 DIAGNOSIS — Z12.5 SCREENING FOR PROSTATE CANCER: ICD-10-CM

## 2024-04-16 DIAGNOSIS — Z00.00 WELCOME TO MEDICARE PREVENTIVE VISIT: Primary | ICD-10-CM

## 2024-04-16 DIAGNOSIS — N63.24 MASS OF LOWER INNER QUADRANT OF LEFT BREAST: ICD-10-CM

## 2024-04-16 DIAGNOSIS — Z11.59 NEED FOR HEPATITIS C SCREENING TEST: ICD-10-CM

## 2024-04-16 PROCEDURE — 93005 ELECTROCARDIOGRAM TRACING: CPT | Performed by: NURSE PRACTITIONER

## 2024-04-16 ASSESSMENT — PATIENT HEALTH QUESTIONNAIRE - PHQ9
SUM OF ALL RESPONSES TO PHQ9 QUESTIONS 1 & 2: 1
2. FEELING DOWN, DEPRESSED OR HOPELESS: SEVERAL DAYS
1. LITTLE INTEREST OR PLEASURE IN DOING THINGS: NOT AT ALL
SUM OF ALL RESPONSES TO PHQ QUESTIONS 1-9: 1

## 2024-04-16 NOTE — PROGRESS NOTES
\"Have you been to the ER, urgent care clinic since your last visit?  Hospitalized since your last visit?\"    NO    “Have you seen or consulted any other health care providers outside of [facility] since your last visit?”    NO            Click Here for Release of Records Request   
The following problems were reviewed today and where indicated follow up appointments were made and/or referrals ordered.    Positive Risk Factor Screenings with Interventions:                Activity, Diet, and Weight:  On average, how many days per week do you engage in moderate to strenuous exercise (like a brisk walk)?: 2 days  On average, how many minutes do you engage in exercise at this level?: 60 min    Do you eat balanced/healthy meals regularly?: Yes    Body mass index is 30.42 kg/m². (!) Abnormal    Obesity Interventions:  Patient declines any further evaluation or treatment             Advanced Directives:  Do you have a Living Will?: (!) No    Intervention:  has NO advanced directive - information provided       LDCT Screening: Discussed with patient the benefits and harms of screening, follow-up diagnostic testing, over-diagnosis, false positive rate, and total radiation exposure. Counseled on the importance of adherence to annual lung cancer LDCT screening, impact of comorbidities, ability and willingness to undergo diagnosis and treatment. Counseled on the importance of maintaining cigarette smoking abstinence and cessation. The patient has a history of >20 pack years and is either still smoking or quit within the last 15 years. There are no signs or symptoms of lung cancer.            Objective   Vitals:    04/16/24 1339 04/16/24 1408   BP: (!) 172/74 (!) 152/84   Site: Left Upper Arm Left Upper Arm   Position: Sitting Sitting   Cuff Size: Small Adult    Pulse: (!) 42 55   Resp: 16    Temp: 98.2 °F (36.8 °C)    TempSrc: Oral    SpO2: 97%    Weight: 93.4 kg (206 lb)    Height: 1.753 m (5' 9\")       Body mass index is 30.42 kg/m².        General Appearance: alert and oriented to person, place and time, well-developed and well-nourished, in no acute distress. Obese  Skin: warm and dry, no rash or erythema  Neck: neck supple and non tender without mass, no thyromegaly or thyroid nodules, no cervical

## 2024-04-17 ENCOUNTER — HOSPITAL ENCOUNTER (OUTPATIENT)
Facility: HOSPITAL | Age: 69
Setting detail: RECURRING SERIES
Discharge: HOME | End: 2024-04-20
Payer: MEDICARE

## 2024-04-17 LAB
ALBUMIN SERPL-MCNC: 4.1 G/DL (ref 3.5–5)
ALBUMIN/GLOB SERPL: 1.1 (ref 1.1–2.2)
ALP SERPL-CCNC: 78 U/L (ref 45–117)
ALT SERPL-CCNC: 27 U/L (ref 12–78)
ANION GAP SERPL CALC-SCNC: 7 MMOL/L (ref 5–15)
AST SERPL-CCNC: 15 U/L (ref 15–37)
BASOPHILS # BLD: 0.1 K/UL (ref 0–0.1)
BASOPHILS NFR BLD: 1 % (ref 0–1)
BILIRUB SERPL-MCNC: 0.5 MG/DL (ref 0.2–1)
BUN SERPL-MCNC: 14 MG/DL (ref 6–20)
BUN/CREAT SERPL: 14 (ref 12–20)
CALCIUM SERPL-MCNC: 9.3 MG/DL (ref 8.5–10.1)
CHLORIDE SERPL-SCNC: 106 MMOL/L (ref 97–108)
CHOLEST SERPL-MCNC: 206 MG/DL
CO2 SERPL-SCNC: 29 MMOL/L (ref 21–32)
CREAT SERPL-MCNC: 1.03 MG/DL (ref 0.7–1.3)
DIFFERENTIAL METHOD BLD: NORMAL
EOSINOPHIL # BLD: 0.1 K/UL (ref 0–0.4)
EOSINOPHIL NFR BLD: 1 % (ref 0–7)
ERYTHROCYTE [DISTWIDTH] IN BLOOD BY AUTOMATED COUNT: 12.4 % (ref 11.5–14.5)
GLOBULIN SER CALC-MCNC: 3.7 G/DL (ref 2–4)
GLUCOSE SERPL-MCNC: 90 MG/DL (ref 65–100)
HCT VFR BLD AUTO: 46.7 % (ref 36.6–50.3)
HDLC SERPL-MCNC: 61 MG/DL
HDLC SERPL: 3.4 (ref 0–5)
HGB BLD-MCNC: 15.9 G/DL (ref 12.1–17)
IMM GRANULOCYTES # BLD AUTO: 0 K/UL (ref 0–0.04)
IMM GRANULOCYTES NFR BLD AUTO: 0 % (ref 0–0.5)
LDLC SERPL CALC-MCNC: 120.6 MG/DL (ref 0–100)
LYMPHOCYTES # BLD: 2.3 K/UL (ref 0.8–3.5)
LYMPHOCYTES NFR BLD: 28 % (ref 12–49)
MCH RBC QN AUTO: 31.6 PG (ref 26–34)
MCHC RBC AUTO-ENTMCNC: 34 G/DL (ref 30–36.5)
MCV RBC AUTO: 92.8 FL (ref 80–99)
MONOCYTES # BLD: 0.6 K/UL (ref 0–1)
MONOCYTES NFR BLD: 7 % (ref 5–13)
NEUTS SEG # BLD: 4.9 K/UL (ref 1.8–8)
NEUTS SEG NFR BLD: 63 % (ref 32–75)
NRBC # BLD: 0 K/UL (ref 0–0.01)
NRBC BLD-RTO: 0 PER 100 WBC
PLATELET # BLD AUTO: 253 K/UL (ref 150–400)
PMV BLD AUTO: 11.1 FL (ref 8.9–12.9)
POTASSIUM SERPL-SCNC: 4.3 MMOL/L (ref 3.5–5.1)
PROT SERPL-MCNC: 7.8 G/DL (ref 6.4–8.2)
PSA SERPL-MCNC: 0.3 NG/ML (ref 0.01–4)
RBC # BLD AUTO: 5.03 M/UL (ref 4.1–5.7)
SODIUM SERPL-SCNC: 142 MMOL/L (ref 136–145)
TRIGL SERPL-MCNC: 122 MG/DL
VLDLC SERPL CALC-MCNC: 24.4 MG/DL
WBC # BLD AUTO: 8 K/UL (ref 4.1–11.1)

## 2024-04-17 PROCEDURE — 97110 THERAPEUTIC EXERCISES: CPT

## 2024-04-17 PROCEDURE — 97012 MECHANICAL TRACTION THERAPY: CPT

## 2024-04-17 NOTE — PROGRESS NOTES
PHYSICAL THERAPY - DAILY TREATMENT NOTE (updated 3/23)      Date: 2024          Patient Name:  Rasta Gutierrez :  1955   Medical   Diagnosis:  Rotator cuff tendinitis, right [M75.81] Treatment Diagnosis:  M25.511  RIGHT SHOULDER PAIN    Referral Source:  Arlene Clark MD Insurance:   Payor: MEDICARE / Plan: MEDICARE PART A AND B / Product Type: *No Product type* /                     Patient  verified yes     Visit #   Current  / Total 6 20   Time   In / Out 430 515   Total Treatment Time 45   Total Timed Codes 25         SUBJECTIVE    Pain Level (0-10 scale): 3/10 sx going down right arm today.    Any medication changes, allergies to medications, adverse drug reactions, diagnosis change, or new procedure performed?: [x] No    [] Yes (see summary sheet for update)  Medications: Verified on Patient Summary List    Subjective functional status/changes:     Muscles feel stiff first thing in the morning.     OBJECTIVE      Therapeutic Procedures:  Tx Min Billable or 1:1 Min (if diff from Tx Min) Procedure, Rationale, Specifics   25  16795 Therapeutic Exercise (timed):  increase ROM, strength, coordination, balance, and proprioception to improve patient's ability to progress to PLOF and address remaining functional goals. (see flow sheet as applicable)     Details if applicable:       51611 Manual Therapy (timed):  decrease pain, increase ROM, increase tissue extensibility, decrease edema, and decrease trigger points to improve patient's ability to progress to PLOF and address remaining functional goals.  The manual therapy interventions were performed at a separate and distinct time from the therapeutic activities interventions . (see flow sheet as applicable)     Details if applicable:  STM to R UT and cervical paraspinal muscle          Details if applicable:           Details if applicable:            Details if applicable:     25     Total Total     Modalities Rationale:     decrease pain and

## 2024-04-18 LAB
HCV AB SERPL QL IA: NORMAL
HCV IGG SERPL QL IA: NON REACTIVE S/CO RATIO

## 2024-04-22 ENCOUNTER — HOSPITAL ENCOUNTER (OUTPATIENT)
Facility: HOSPITAL | Age: 69
Setting detail: RECURRING SERIES
Discharge: HOME | End: 2024-04-25
Payer: MEDICARE

## 2024-04-22 PROCEDURE — 97012 MECHANICAL TRACTION THERAPY: CPT

## 2024-04-22 PROCEDURE — 97110 THERAPEUTIC EXERCISES: CPT

## 2024-04-22 NOTE — PROGRESS NOTES
Modalities Rationale:     decrease pain and increase tissue extensibility to improve patient's ability to progress to PLOF and address remaining functional goals.       min [] Estim Unattended,             type/location:       []  w/ice    []  w/heat        min [] Estim Attended,             type/location:       []  w/ice   []  w/heat         []  w/US   []  TENS insruct       15     min [x]  Mechanical Traction,        type/lbs: 22//2        []  pro      [x]  sup           [x]  int       []  cont            []  before manual           []  after manual     min []  Ultrasound,         settings/location:      min  unbilled []  Ice     []  Heat            location/position:         min []  Vasopneumatic Device,      press/temp:   pre-treatment girth :    post-treatment girth :    measured at (landmark       location) :   If using vaso (only need to measure limb vaso being performed on)        min []  Other:        Skin assessment post-treatment (if applicable):    [x]  intact    []  redness- no adverse reaction                 []redness - adverse reaction:              [x]  Patient Education billed concurrently with other procedures   [x] Review HEP    [] Progressed/Changed HEP, detail:    [] Other detail:     Access Code: [email or web address]  URL: [email or web address]  Date: 04/05/2024  Prepared by: You Stark Jr    Exercises  - Standing Bicep Stretch at Wall  - 1 x daily - 7 x weekly - 3 sets - 10 reps  - Standing Wrist Extension Stretch  - 1 x daily - 7 x weekly - 3 sets - 10 reps  - Chest and Bicep Stretch - Arms Behind Back  - 1 x daily - 7 x weekly - 3 sets - 10 reps      Other Objective/Functional Measures  Aarom neck normal  Kyphosis  Forward head.  Stands with knees flexed.  Also trigger finger right hand fingers #1 and 2  Diminished shoulder rom bilat.    Pain Level at end of session (0-10 scale): 2      Assessment   Pt demostrated good tolerance during TE without adverse reactions.     Patient will

## 2024-04-24 ENCOUNTER — HOSPITAL ENCOUNTER (OUTPATIENT)
Facility: HOSPITAL | Age: 69
Setting detail: RECURRING SERIES
Discharge: HOME | End: 2024-04-27
Payer: MEDICARE

## 2024-04-24 PROCEDURE — 97110 THERAPEUTIC EXERCISES: CPT

## 2024-04-24 PROCEDURE — 97012 MECHANICAL TRACTION THERAPY: CPT

## 2024-04-24 NOTE — PROGRESS NOTES
PHYSICAL THERAPY - DAILY TREATMENT NOTE (updated 3/23)      Date: 2024          Patient Name:  Rasta Gutierrez :  1955   Medical   Diagnosis:  Rotator cuff tendinitis, right [M75.81] Treatment Diagnosis:  M25.511  RIGHT SHOULDER PAIN    Referral Source:  Arlene Clark MD Insurance:   Payor: MEDICARE / Plan: MEDICARE PART A AND B / Product Type: *No Product type* /                     Patient  verified yes     Visit #   Current  / Total 8 20   Time   In / Out 955 1040   Total Treatment Time 45   Total Timed Codes 25         SUBJECTIVE    Pain Level (0-10 scale): no sx right now. Bothers at night. Better during the day.    Any medication changes, allergies to medications, adverse drug reactions, diagnosis change, or new procedure performed?: [x] No    [] Yes (see summary sheet for update)  Medications: Verified on Patient Summary List    Subjective functional status/changes:         OBJECTIVE      Therapeutic Procedures:  Tx Min Billable or 1:1 Min (if diff from Tx Min) Procedure, Rationale, Specifics   25  13367 Therapeutic Exercise (timed):  increase ROM, strength, coordination, balance, and proprioception to improve patient's ability to progress to PLOF and address remaining functional goals. (see flow sheet as applicable)     Details if applicable:       42779 Manual Therapy (timed):  decrease pain, increase ROM, increase tissue extensibility, decrease edema, and decrease trigger points to improve patient's ability to progress to PLOF and address remaining functional goals.  The manual therapy interventions were performed at a separate and distinct time from the therapeutic activities interventions . (see flow sheet as applicable)     Details if applicable:  STM to R UT and cervical paraspinal muscle          Details if applicable:           Details if applicable:            Details if applicable:     25     Total Total     Modalities Rationale:     decrease pain and increase tissue

## 2024-04-29 LAB — NONINV COLON CA DNA+OCC BLD SCRN STL QL: NEGATIVE

## 2024-04-30 ENCOUNTER — HOSPITAL ENCOUNTER (OUTPATIENT)
Facility: HOSPITAL | Age: 69
Setting detail: RECURRING SERIES
Discharge: HOME | End: 2024-05-03
Payer: MEDICARE

## 2024-04-30 PROCEDURE — 97110 THERAPEUTIC EXERCISES: CPT

## 2024-04-30 PROCEDURE — 97012 MECHANICAL TRACTION THERAPY: CPT

## 2024-04-30 NOTE — PROGRESS NOTES
PHYSICAL THERAPY - DAILY TREATMENT NOTE (updated 3/23)      Date: 2024          Patient Name:  Rasta Gutierrez :  1955   Medical   Diagnosis:  Rotator cuff tendinitis, right [M75.81] Treatment Diagnosis:  M25.511  RIGHT SHOULDER PAIN    Referral Source:  Arlene Clark MD Insurance:   Payor: MEDICARE / Plan: MEDICARE PART A AND B / Product Type: *No Product type* /                     Patient  verified yes     Visit #   Current  / Total 9 20   Time   In / Out 1015 1100   Total Treatment Time 45   Total Timed Codes 25         SUBJECTIVE    Pain Level (0-10 scale): no sx right now. Bothers at night. Better during the day.    Any medication changes, allergies to medications, adverse drug reactions, diagnosis change, or new procedure performed?: [x] No    [] Yes (see summary sheet for update)  Medications: Verified on Patient Summary List    Subjective functional status/changes:         OBJECTIVE      Therapeutic Procedures:  Tx Min Billable or 1:1 Min (if diff from Tx Min) Procedure, Rationale, Specifics   25  49770 Therapeutic Exercise (timed):  increase ROM, strength, coordination, balance, and proprioception to improve patient's ability to progress to PLOF and address remaining functional goals. (see flow sheet as applicable)     Details if applicable:       85042 Manual Therapy (timed):  decrease pain, increase ROM, increase tissue extensibility, decrease edema, and decrease trigger points to improve patient's ability to progress to PLOF and address remaining functional goals.  The manual therapy interventions were performed at a separate and distinct time from the therapeutic activities interventions . (see flow sheet as applicable)     Details if applicable:  STM to R UT and cervical paraspinal muscle          Details if applicable:           Details if applicable:            Details if applicable:     25     Total Total     Modalities Rationale:     decrease pain and increase tissue

## 2024-05-02 ENCOUNTER — HOSPITAL ENCOUNTER (OUTPATIENT)
Facility: HOSPITAL | Age: 69
Setting detail: RECURRING SERIES
Discharge: HOME | End: 2024-05-05
Payer: MEDICARE

## 2024-05-02 PROCEDURE — 97110 THERAPEUTIC EXERCISES: CPT

## 2024-05-02 NOTE — PROGRESS NOTES
[facility]  [address], [address]  [place] [address]  Phone: [phone]    Fax: [phone]    PHYSICAL THERAPY PROGRESS NOTE  Patient Name:  Rasta Gutierrez :  1955   Treatment/Medical Diagnosis: Rotator cuff tendinitis, right [M75.81]   Referral Source:  Arlene Clark MD     Date of Initial Visit:  3/27/24 Attended Visits:  10 Missed Visits:  0     SUMMARY OF TREATMENT/ASSESSMENT:    Ongoing r/o cervical radiculopathy into right arm. Still trouble with sleep. Still trouble doing chores.    Mr. Gutierrez continues to make progress towards his goals. He continues to have discomfort at night and recently tried lifting weights with some discomfort noted. He has noticed an improvement with his symptoms. More pain noted within bicep and forearm.     CURRENT STATUS/GOALS  Short Term Goals: To be accomplished in 10 treatments.  BILAT. SHOULDER ROM IMPROVED TO WNL progressing 24  Long Term Goals: To be accomplished in 20 treatments.  Reach/lift/carry/work/sleep wnl undisturbed by RIGHT ARM PAIN progressing 24      RECOMMENDATIONS FOR SKILLED THERAPY  Continue POC initiated by PT until all goals are met.          SHELLY LOPEZ JR, PTA       2024       11:09 AM    If you have any questions/comments please contact us directly at [phone].   Thank you for allowing us to assist in the care of your patient.

## 2024-05-02 NOTE — PROGRESS NOTES
PHYSICAL THERAPY - DAILY TREATMENT NOTE (updated 3/23)      Date: 2024          Patient Name:  Rasta Gutierrez :  1955   Medical   Diagnosis:  Rotator cuff tendinitis, right [M75.81] Treatment Diagnosis:  M25.511  RIGHT SHOULDER PAIN    Referral Source:  Arlene Clark MD Insurance:   Payor: MEDICARE / Plan: MEDICARE PART A AND B / Product Type: *No Product type* /                     Patient  verified yes     Visit #   Current  / Total 10 20   Time   In / Out 1020 1105   Total Treatment Time 45   Total Timed Codes 38         SUBJECTIVE    Pain Level (0-10 scale): no sx right now. Bothers at night. Better during the day.    Any medication changes, allergies to medications, adverse drug reactions, diagnosis change, or new procedure performed?: [x] No    [] Yes (see summary sheet for update)  Medications: Verified on Patient Summary List    Subjective functional status/changes:         OBJECTIVE      Therapeutic Procedures:  Tx Min Billable or 1:1 Min (if diff from Tx Min) Procedure, Rationale, Specifics   38  64627 Therapeutic Exercise (timed):  increase ROM, strength, coordination, balance, and proprioception to improve patient's ability to progress to PLOF and address remaining functional goals. (see flow sheet as applicable)     Details if applicable:       96760 Manual Therapy (timed):  decrease pain, increase ROM, increase tissue extensibility, decrease edema, and decrease trigger points to improve patient's ability to progress to PLOF and address remaining functional goals.  The manual therapy interventions were performed at a separate and distinct time from the therapeutic activities interventions . (see flow sheet as applicable)     Details if applicable:  STM to R UT and cervical paraspinal muscle          Details if applicable:           Details if applicable:            Details if applicable:     38     Total Total     Modalities Rationale:     decrease pain and increase tissue

## 2024-05-06 ENCOUNTER — HOSPITAL ENCOUNTER (OUTPATIENT)
Facility: HOSPITAL | Age: 69
Setting detail: RECURRING SERIES
Discharge: HOME | End: 2024-05-09
Payer: MEDICARE

## 2024-05-06 PROCEDURE — 97110 THERAPEUTIC EXERCISES: CPT

## 2024-05-06 PROCEDURE — 97012 MECHANICAL TRACTION THERAPY: CPT

## 2024-05-08 ENCOUNTER — HOSPITAL ENCOUNTER (OUTPATIENT)
Facility: HOSPITAL | Age: 69
Setting detail: RECURRING SERIES
Discharge: HOME | End: 2024-05-11
Payer: MEDICARE

## 2024-05-08 PROCEDURE — 97110 THERAPEUTIC EXERCISES: CPT

## 2024-05-08 PROCEDURE — 97012 MECHANICAL TRACTION THERAPY: CPT

## 2024-05-08 NOTE — PROGRESS NOTES
PHYSICAL THERAPY - DAILY TREATMENT NOTE (updated 3/23)      Date: 2024          Patient Name:  Rasta Gutierrez :  1955   Medical   Diagnosis:  Rotator cuff tendinitis, right [M75.81] Treatment Diagnosis:  M25.511  RIGHT SHOULDER PAIN    Referral Source:  Arlene Clark MD Insurance:   Payor: MEDICARE / Plan: MEDICARE PART A AND B / Product Type: *No Product type* /                     Patient  verified yes     Visit #   Current  / Total 12 20   Time   In / Out 800 845   Total Treatment Time 45   Total Timed Codes 30         SUBJECTIVE    Pain Level (0-10 scale): saw my doc; now on steroid pack  MRI in 2016 showed bulging disk low back    Any medication changes, allergies to medications, adverse drug reactions, diagnosis change, or new procedure performed?: [x] No    [] Yes (see summary sheet for update)  Medications: Verified on Patient Summary List    Subjective functional status/changes:         OBJECTIVE      Therapeutic Procedures:  Tx Min Billable or 1:1 Min (if diff from Tx Min) Procedure, Rationale, Specifics   30  03113 Therapeutic Exercise (timed):  increase ROM, strength, coordination, balance, and proprioception to improve patient's ability to progress to PLOF and address remaining functional goals. (see flow sheet as applicable)     Details if applicable:       35958 Manual Therapy (timed):  decrease pain, increase ROM, increase tissue extensibility, decrease edema, and decrease trigger points to improve patient's ability to progress to PLOF and address remaining functional goals.  The manual therapy interventions were performed at a separate and distinct time from the therapeutic activities interventions . (see flow sheet as applicable)     Details if applicable:  STM to R UT and cervical paraspinal muscle          Details if applicable:           Details if applicable:            Details if applicable:     30     Total Total     Modalities Rationale:     decrease pain and increase

## 2024-05-13 ENCOUNTER — PATIENT MESSAGE (OUTPATIENT)
Age: 69
End: 2024-05-13

## 2024-05-13 ENCOUNTER — APPOINTMENT (OUTPATIENT)
Facility: HOSPITAL | Age: 69
End: 2024-05-13
Payer: MEDICARE

## 2024-05-13 RX ORDER — NYSTATIN 100000 U/G
CREAM TOPICAL 2 TIMES DAILY
Qty: 30 G | Refills: 2 | Status: SHIPPED | OUTPATIENT
Start: 2024-05-13

## 2024-05-13 NOTE — TELEPHONE ENCOUNTER
From: Rasta Gutierrez  To: Dr. Siddhartha Mitchell  Sent: 5/13/2024 2:35 PM EDT  Subject: write a prescription    Good afternoon   DR. Mitchell  Would you please consider writing a prescription for another tube of Nystatin Cream. I've had another flair-up and have made an appointment to see Dr. Moreno at [facility] but couldn't get an appointment till August.    Philip Gutierrez  BD [birth year]

## 2024-05-15 ENCOUNTER — HOSPITAL ENCOUNTER (OUTPATIENT)
Facility: HOSPITAL | Age: 69
Setting detail: RECURRING SERIES
Discharge: HOME | End: 2024-05-18
Payer: MEDICARE

## 2024-05-15 PROCEDURE — 97012 MECHANICAL TRACTION THERAPY: CPT

## 2024-05-15 PROCEDURE — 97110 THERAPEUTIC EXERCISES: CPT

## 2024-05-15 NOTE — PROGRESS NOTES
PHYSICAL THERAPY - DAILY TREATMENT NOTE (updated 3/23)      Date: 5/15/2024          Patient Name:  Rasta Gutierrez :  1955   Medical   Diagnosis:  Rotator cuff tendinitis, right [M75.81] Treatment Diagnosis:  M25.511  RIGHT SHOULDER PAIN    Referral Source:  Arlene Clark MD Insurance:   Payor: MEDICARE / Plan: MEDICARE PART A AND B / Product Type: *No Product type* /                     Patient  verified yes     Visit #   Current  / Total 13 20   Time   In / Out 845 930   Total Treatment Time 45   Total Timed Codes 25         SUBJECTIVE    Pain Level (0-10 scale): 0          finished steroid pack; it took away the sx. Now doc is switching me to meloxicam.  MRI in 2016 showed bulging disk low back    Any medication changes, allergies to medications, adverse drug reactions, diagnosis change, or new procedure performed?: [x] No    [] Yes (see summary sheet for update)  Medications: Verified on Patient Summary List    Subjective functional status/changes:         OBJECTIVE      Therapeutic Procedures:  Tx Min Billable or 1:1 Min (if diff from Tx Min) Procedure, Rationale, Specifics   25  50538 Therapeutic Exercise (timed):  increase ROM, strength, coordination, balance, and proprioception to improve patient's ability to progress to PLOF and address remaining functional goals. (see flow sheet as applicable)     Details if applicable:       78844 Manual Therapy (timed):  decrease pain, increase ROM, increase tissue extensibility, decrease edema, and decrease trigger points to improve patient's ability to progress to PLOF and address remaining functional goals.  The manual therapy interventions were performed at a separate and distinct time from the therapeutic activities interventions . (see flow sheet as applicable)     Details if applicable:  STM to R UT and cervical paraspinal muscle          Details if applicable:           Details if applicable:            Details if applicable:     25     Total

## 2024-05-17 ENCOUNTER — HOSPITAL ENCOUNTER (OUTPATIENT)
Facility: HOSPITAL | Age: 69
Setting detail: RECURRING SERIES
Discharge: HOME | End: 2024-05-20
Payer: MEDICARE

## 2024-05-17 PROCEDURE — 97110 THERAPEUTIC EXERCISES: CPT

## 2024-05-17 PROCEDURE — 97012 MECHANICAL TRACTION THERAPY: CPT

## 2024-05-17 NOTE — PROGRESS NOTES
PHYSICAL THERAPY - DAILY TREATMENT NOTE (updated 3/23)      Date: 2024          Patient Name:  Rasta Gutierrez :  1955   Medical   Diagnosis:  Rotator cuff tendinitis, right [M75.81] Treatment Diagnosis:  M25.511  RIGHT SHOULDER PAIN    Referral Source:  Arlene Clark MD Insurance:   Payor: MEDICARE / Plan: MEDICARE PART A AND B / Product Type: *No Product type* /                     Patient  verified yes     Visit #   Current  / Total 14 20   Time   In / Out 930 1015   Total Treatment Time 45   Total Timed Codes 38         SUBJECTIVE    Pain Level (0-10 scale): 0          finished steroid pack; it took away the sx. Now doc is switching me to meloxicam.  MRI in 2016 showed bulging disk low back    Any medication changes, allergies to medications, adverse drug reactions, diagnosis change, or new procedure performed?: [x] No    [] Yes (see summary sheet for update)  Medications: Verified on Patient Summary List    Subjective functional status/changes:         OBJECTIVE      Therapeutic Procedures:  Tx Min Billable or 1:1 Min (if diff from Tx Min) Procedure, Rationale, Specifics   23  95745 Therapeutic Exercise (timed):  increase ROM, strength, coordination, balance, and proprioception to improve patient's ability to progress to PLOF and address remaining functional goals. (see flow sheet as applicable)     Details if applicable:       20189 Manual Therapy (timed):  decrease pain, increase ROM, increase tissue extensibility, decrease edema, and decrease trigger points to improve patient's ability to progress to PLOF and address remaining functional goals.  The manual therapy interventions were performed at a separate and distinct time from the therapeutic activities interventions . (see flow sheet as applicable)     Details if applicable:  STM to R UT and cervical paraspinal muscle          Details if applicable:           Details if applicable:            Details if applicable:     23     Total

## 2024-05-21 ENCOUNTER — APPOINTMENT (OUTPATIENT)
Facility: HOSPITAL | Age: 69
End: 2024-05-21
Payer: MEDICARE

## 2024-05-22 ENCOUNTER — HOSPITAL ENCOUNTER (OUTPATIENT)
Facility: HOSPITAL | Age: 69
Setting detail: RECURRING SERIES
Discharge: HOME | End: 2024-05-25
Payer: MEDICARE

## 2024-05-22 PROCEDURE — 97012 MECHANICAL TRACTION THERAPY: CPT

## 2024-05-22 PROCEDURE — 97110 THERAPEUTIC EXERCISES: CPT

## 2024-05-22 NOTE — PROGRESS NOTES
PHYSICAL THERAPY - DAILY TREATMENT NOTE (updated 3/23)      Date: 2024          Patient Name:  Rasta Gutierrez :  1955   Medical   Diagnosis:  Rotator cuff tendinitis, right [M75.81] Treatment Diagnosis:  M25.511  RIGHT SHOULDER PAIN    Referral Source:  Arlene Clark MD Insurance:   Payor: MEDICARE / Plan: MEDICARE PART A AND B / Product Type: *No Product type* /                     Patient  verified yes     Visit #   Current  / Total 15 20   Time   In / Out 745 830   Total Treatment Time 45   Total Timed Codes 30         SUBJECTIVE    Pain Level (0-10 scale): 2          finished steroid pack; it took away the sx. Now doc is switching me to meloxicam.  MRI in 2016 showed bulging disk low back    Any medication changes, allergies to medications, adverse drug reactions, diagnosis change, or new procedure performed?: [x] No    [] Yes (see summary sheet for update)  Medications: Verified on Patient Summary List    Subjective functional status/changes:     Sleep; waking up hurting once a night. Then I do some pullovers exercises and it calms down.  Haven't tried my home traction yet.    OBJECTIVE      Therapeutic Procedures:  Tx Min Billable or 1:1 Min (if diff from Tx Min) Procedure, Rationale, Specifics   30  89428 Therapeutic Exercise (timed):  increase ROM, strength, coordination, balance, and proprioception to improve patient's ability to progress to PLOF and address remaining functional goals. (see flow sheet as applicable)     Details if applicable:       34471 Manual Therapy (timed):  decrease pain, increase ROM, increase tissue extensibility, decrease edema, and decrease trigger points to improve patient's ability to progress to PLOF and address remaining functional goals.  The manual therapy interventions were performed at a separate and distinct time from the therapeutic activities interventions . (see flow sheet as applicable)     Details if applicable:  STM to R UT and cervical

## 2024-05-23 ENCOUNTER — HOSPITAL ENCOUNTER (OUTPATIENT)
Facility: HOSPITAL | Age: 69
Setting detail: RECURRING SERIES
Discharge: HOME | End: 2024-05-26
Payer: MEDICARE

## 2024-05-23 PROCEDURE — 97110 THERAPEUTIC EXERCISES: CPT

## 2024-05-23 PROCEDURE — 97012 MECHANICAL TRACTION THERAPY: CPT

## 2024-05-23 NOTE — PROGRESS NOTES
PHYSICAL THERAPY - DAILY TREATMENT NOTE (updated 3/23)      Date: 2024          Patient Name:  Rasta Gutierrez :  1955   Medical   Diagnosis:  Rotator cuff tendinitis, right [M75.81] Treatment Diagnosis:  M25.511  RIGHT SHOULDER PAIN    Referral Source:  Arlene Clark MD Insurance:   Payor: MEDICARE / Plan: MEDICARE PART A AND B / Product Type: *No Product type* /                     Patient  verified yes     Visit #   Current  / Total 16 20   Time   In / Out 855 940   Total Treatment Time 45   Total Timed Codes 40         SUBJECTIVE    Pain Level (0-10 scale): 2          finished steroid pack; it took away the sx. Now doc is switching me to meloxicam.  MRI in 2016 showed bulging disk low back    Any medication changes, allergies to medications, adverse drug reactions, diagnosis change, or new procedure performed?: [x] No    [] Yes (see summary sheet for update)  Medications: Verified on Patient Summary List    Subjective functional status/changes:     Sleep; waking up hurting once a night. Then I do some pullovers exercises and it calms down.  Haven't tried my home traction yet.    OBJECTIVE      Therapeutic Procedures:  Tx Min Billable or 1:1 Min (if diff from Tx Min) Procedure, Rationale, Specifics   25  93427 Therapeutic Exercise (timed):  increase ROM, strength, coordination, balance, and proprioception to improve patient's ability to progress to PLOF and address remaining functional goals. (see flow sheet as applicable)     Details if applicable:       90515 Manual Therapy (timed):  decrease pain, increase ROM, increase tissue extensibility, decrease edema, and decrease trigger points to improve patient's ability to progress to PLOF and address remaining functional goals.  The manual therapy interventions were performed at a separate and distinct time from the therapeutic activities interventions . (see flow sheet as applicable)     Details if applicable:  STM to R UT and cervical

## 2024-05-28 ENCOUNTER — HOSPITAL ENCOUNTER (OUTPATIENT)
Facility: HOSPITAL | Age: 69
Setting detail: RECURRING SERIES
Discharge: HOME | End: 2024-05-31
Payer: MEDICARE

## 2024-05-28 PROCEDURE — 97110 THERAPEUTIC EXERCISES: CPT

## 2024-05-28 PROCEDURE — 97012 MECHANICAL TRACTION THERAPY: CPT

## 2024-05-28 NOTE — PROGRESS NOTES
PHYSICAL THERAPY - DAILY TREATMENT NOTE (updated 3/23)      Date: 2024          Patient Name:  Rasta Gutierrez :  1955   Medical   Diagnosis:  Rotator cuff tendinitis, right [M75.81] Treatment Diagnosis:  M25.511  RIGHT SHOULDER PAIN    Referral Source:  Arlene Clark MD Insurance:   Payor: MEDICARE / Plan: MEDICARE PART A AND B / Product Type: *No Product type* /                     Patient  verified yes     Visit #   Current  / Total 17 20   Time   In / Out 1035 1120   Total Treatment Time 45   Total Timed Codes 40         SUBJECTIVE    Pain Level (0-10 scale): 2          I think the Meloxicam is helping. Right upper brachium ache.  MRI in 2016 showed bulging disk low back    Any medication changes, allergies to medications, adverse drug reactions, diagnosis change, or new procedure performed?: [x] No    [] Yes (see summary sheet for update)  Medications: Verified on Patient Summary List    Subjective functional status/changes:     Sleep; waking up hurting once a night. Then I do some pullovers exercises and it calms down.  Haven't tried my home traction yet.    OBJECTIVE      Therapeutic Procedures:  Tx Min Billable or 1:1 Min (if diff from Tx Min) Procedure, Rationale, Specifics   30  87426 Therapeutic Exercise (timed):  increase ROM, strength, coordination, balance, and proprioception to improve patient's ability to progress to PLOF and address remaining functional goals. (see flow sheet as applicable)     Details if applicable:       88364 Manual Therapy (timed):  decrease pain, increase ROM, increase tissue extensibility, decrease edema, and decrease trigger points to improve patient's ability to progress to PLOF and address remaining functional goals.  The manual therapy interventions were performed at a separate and distinct time from the therapeutic activities interventions . (see flow sheet as applicable)     Details if applicable:  STM to R UT and cervical paraspinal muscle

## 2024-05-30 ENCOUNTER — HOSPITAL ENCOUNTER (OUTPATIENT)
Facility: HOSPITAL | Age: 69
Setting detail: RECURRING SERIES
End: 2024-05-30
Payer: MEDICARE

## 2024-05-30 PROCEDURE — 97110 THERAPEUTIC EXERCISES: CPT

## 2024-05-30 PROCEDURE — 97012 MECHANICAL TRACTION THERAPY: CPT

## 2024-05-30 NOTE — PROGRESS NOTES
PHYSICAL THERAPY - DAILY TREATMENT NOTE (updated 3/23)      Date: 2024          Patient Name:  Rasta Gutierrez :  1955   Medical   Diagnosis:  Rotator cuff tendinitis, right [M75.81] Treatment Diagnosis:  M25.511  RIGHT SHOULDER PAIN    Referral Source:  Arlene Clark MD Insurance:   Payor: MEDICARE / Plan: MEDICARE PART A AND B / Product Type: *No Product type* /                     Patient  verified yes     Visit #   Current  / Total 18 20   Time   In / Out 1050 1145   Total Treatment Time 55   Total Timed Codes 45         SUBJECTIVE    Pain Level (0-10 scale): 0-1          I think the Meloxicam is helping. Right upper brachium ache.  MRI in 2016 showed bulging disk low back    Any medication changes, allergies to medications, adverse drug reactions, diagnosis change, or new procedure performed?: [x] No    [] Yes (see summary sheet for update)  Medications: Verified on Patient Summary List    Subjective functional status/changes:     Sleep; waking up hurting once a night. Then I do some pullovers exercises and it calms down.  Haven't tried my home traction yet.    OBJECTIVE      Therapeutic Procedures:  Tx Min Billable or 1:1 Min (if diff from Tx Min) Procedure, Rationale, Specifics   30  64411 Therapeutic Exercise (timed):  increase ROM, strength, coordination, balance, and proprioception to improve patient's ability to progress to PLOF and address remaining functional goals. (see flow sheet as applicable)     Details if applicable:       19024 Manual Therapy (timed):  decrease pain, increase ROM, increase tissue extensibility, decrease edema, and decrease trigger points to improve patient's ability to progress to PLOF and address remaining functional goals.  The manual therapy interventions were performed at a separate and distinct time from the therapeutic activities interventions . (see flow sheet as applicable)     Details if applicable:  STM to R UT and cervical paraspinal muscle       (0-10 scale): 0      Assessment   Pt demostrated good tolerance during TE without adverse reactions.    Working towards my goals.    Patient will continue to benefit from skilled PT / OT services to modify and progress therapeutic interventions, analyze and address functional mobility deficits, analyze and address ROM deficits, analyze and address strength deficits, analyze and address soft tissue restrictions, analyze and cue for proper movement patterns, and analyze and modify for postural abnormalities to address functional deficits and attain remaining goals.    Progress toward goals / Updated goals:  []  See Progress Note/Recertification    Short Term Goals: To be accomplished in 10 treatments.  BILAT. SHOULDER ROM IMPROVED TO WNL  Long Term Goals: To be accomplished in 20 treatments.  Reach/lift/carry/work/sleep wnl undisturbed by RIGHT ARM PAIN      PLAN  Yes  Continue plan of care  Re-Cert Due: 6/27/24  [x]  Upgrade activities as tolerated  []  Discharge due to :  []  Other:      JULIAN CISSE, PTA       5/30/2024       1:43 PM

## 2024-06-03 ENCOUNTER — APPOINTMENT (OUTPATIENT)
Facility: HOSPITAL | Age: 69
End: 2024-06-03
Payer: MEDICARE

## 2024-06-05 ENCOUNTER — HOSPITAL ENCOUNTER (OUTPATIENT)
Facility: HOSPITAL | Age: 69
Setting detail: RECURRING SERIES
Discharge: HOME | End: 2024-06-08
Payer: MEDICARE

## 2024-06-05 PROCEDURE — 97110 THERAPEUTIC EXERCISES: CPT

## 2024-06-05 PROCEDURE — 97012 MECHANICAL TRACTION THERAPY: CPT

## 2024-06-05 NOTE — PROGRESS NOTES
PHYSICAL THERAPY - DAILY TREATMENT NOTE (updated 3/23)      Date: 2024          Patient Name:  Rasta Gutierrez :  1955   Medical   Diagnosis:  Rotator cuff tendinitis, right [M75.81] Treatment Diagnosis:  M25.511  RIGHT SHOULDER PAIN    Referral Source:  Arlene Clark MD Insurance:   Payor: MEDICARE / Plan: MEDICARE PART A AND B / Product Type: *No Product type* /                     Patient  verified yes     Visit #   Current  / Total 19 20   Time   In / Out 900 945   Total Treatment Time 45   Total Timed Codes 20         SUBJECTIVE    Pain Level (0-10 scale): significant, especially at night. Had to sleep in recliner the other night.  MRI in 2016 showed bulging disk low back    Any medication changes, allergies to medications, adverse drug reactions, diagnosis change, or new procedure performed?: [x] No    [] Yes (see summary sheet for update)  Medications: Verified on Patient Summary List    Subjective functional status/changes:     OBJECTIVE      Therapeutic Procedures:  Tx Min Billable or 1:1 Min (if diff from Tx Min) Procedure, Rationale, Specifics   20  67807 Therapeutic Exercise (timed):  increase ROM, strength, coordination, balance, and proprioception to improve patient's ability to progress to PLOF and address remaining functional goals. (see flow sheet as applicable)     Details if applicable:       33668 Manual Therapy (timed):  decrease pain, increase ROM, increase tissue extensibility, decrease edema, and decrease trigger points to improve patient's ability to progress to PLOF and address remaining functional goals.  The manual therapy interventions were performed at a separate and distinct time from the therapeutic activities interventions . (see flow sheet as applicable)     Details if applicable:  STM to R UT and cervical paraspinal muscle          Details if applicable:           Details if applicable:            Details if applicable:     20     Total Total     Modalities

## 2024-06-05 NOTE — PROGRESS NOTES
[facility]  [address], [address]  [place] [address]  Phone: [phone]    Fax: [phone]    PHYSICAL THERAPY PROGRESS NOTE  Patient Name:  Rasta Gutierrez :  1955   Treatment/Medical Diagnosis: Rotator cuff tendinitis, right [M75.81]   Referral Source:  Arlene Clark MD     Date of Initial Visit:  3/27/24 Attended Visits:  19 Missed Visits:       SUMMARY OF TREATMENT/ASSESSMENT:  Despite PT, meloxicam, traction, trial of steroids, home exercises, bilateral UE radiculopathy symptoms interfering with sleep continue. Recommend MRI.    CURRENT STATUS/GOALS  Goals not met:  Short Term Goals: To be accomplished in 10 treatments.  BILAT. SHOULDER ROM IMPROVED TO WNL  Long Term Goals: To be accomplished in 20 treatments.  Reach/lift/carry/work/sleep wnl undisturbed by RIGHT ARM PAIN      RECOMMENDATIONS FOR SKILLED THERAPY  Continue PT. Recommend neck MRI.        Kris Drummond, PT       2024       9:36 AM    If you have any questions/comments please contact us directly at [phone].   Thank you for allowing us to assist in the care of your patient.

## 2024-06-11 ENCOUNTER — HOSPITAL ENCOUNTER (OUTPATIENT)
Facility: HOSPITAL | Age: 69
Setting detail: RECURRING SERIES
Discharge: HOME | End: 2024-06-14
Payer: MEDICARE

## 2024-06-11 PROCEDURE — 97012 MECHANICAL TRACTION THERAPY: CPT

## 2024-06-11 PROCEDURE — 97110 THERAPEUTIC EXERCISES: CPT

## 2024-06-11 NOTE — PROGRESS NOTES
PHYSICAL THERAPY - DAILY TREATMENT NOTE (updated 3/23)      Date: 2024          Patient Name:  Rasta Gutierrez :  1955   Medical   Diagnosis:  Rotator cuff tendinitis, right [M75.81] Treatment Diagnosis:  M25.511  RIGHT SHOULDER PAIN    Referral Source:  Arlene Clark MD Insurance:   Payor: MEDICARE / Plan: MEDICARE PART A AND B / Product Type: *No Product type* /                     Patient  verified yes     Visit #   Current  / Total 20 20+4   Time   In / Out 740 825   Total Treatment Time 45   Total Timed Codes 30         SUBJECTIVE    Pain Level (0-10 scale):            2/10   significant, especially at night. Had to sleep in recliner last night.  MRI in 2016 showed bulging disk low back    Any medication changes, allergies to medications, adverse drug reactions, diagnosis change, or new procedure performed?: [x] No    [] Yes (see summary sheet for update)  Medications: Verified on Patient Summary List    Subjective functional status/changes:     OBJECTIVE      Therapeutic Procedures:  Tx Min Billable or 1:1 Min (if diff from Tx Min) Procedure, Rationale, Specifics   30  87435 Therapeutic Exercise (timed):  increase ROM, strength, coordination, balance, and proprioception to improve patient's ability to progress to PLOF and address remaining functional goals. (see flow sheet as applicable)     Details if applicable:       53594 Manual Therapy (timed):  decrease pain, increase ROM, increase tissue extensibility, decrease edema, and decrease trigger points to improve patient's ability to progress to PLOF and address remaining functional goals.  The manual therapy interventions were performed at a separate and distinct time from the therapeutic activities interventions . (see flow sheet as applicable)     Details if applicable:  STM to R UT and cervical paraspinal muscle          Details if applicable:           Details if applicable:            Details if applicable:     30     Total Total

## 2024-06-13 ENCOUNTER — HOSPITAL ENCOUNTER (OUTPATIENT)
Facility: HOSPITAL | Age: 69
Setting detail: RECURRING SERIES
Discharge: HOME | End: 2024-06-16
Payer: MEDICARE

## 2024-06-13 PROCEDURE — 97110 THERAPEUTIC EXERCISES: CPT | Performed by: PHYSICAL THERAPIST

## 2024-06-13 PROCEDURE — 97161 PT EVAL LOW COMPLEX 20 MIN: CPT | Performed by: PHYSICAL THERAPIST

## 2024-06-13 NOTE — THERAPY EVALUATION
tests and measures addressing body structure, function, activity limitation and / or participation in recreation  ;Presentation:  LOW Complexity : Stable, uncomplicated  ;Clinical Decision Making:  Other outcome measures Oswestry Disability:  30%  LOW  Overall Complexity Rating: LOW   Problem List: pain affecting function, decrease ROM, edema affection function, decrease ADL/functional abilities, and decrease activity tolerance   Treatment Plan may include any combination of the followin Therapeutic Exercise, 97583 Neuromuscular Re-Education, 38328 Manual Therapy, and 41566 Therapeutic Activity  Patient / Family readiness to learn indicated by: trying to perform skills and interest  Persons(s) to be included in education: patient (P)  Barriers to Learning/Limitations: none  Measures taken if barriers to learning present: na  Patient Self Reported Health Status: good  Rehabilitation Potential: good    Short Term Goals: To be accomplished in 8 treatments.   Independent with HEP    Patient reports pain < 2/10 with routine ADL   Long Term Goals: To be accomplished in 24 treatments.   Restore functional TL AROM:  patient can put on socks,  objects off floor.    Improve Oswestry by > 9 points .     Pain < 2/10 with resumption of gym program.     Frequency / Duration: Patient to be seen 2 times per week for 24 treatments.    Patient/ Caregiver education and instruction: Diagnosis, prognosis, activity modification and exercises   [x]  Plan of care has been reviewed with PTA      Certification Period: 2024 to 2024 Hermelinda Waite, PT       2024       1:00 PM        ===================================================================  I certify that the above Therapy Services are being furnished while the patient is under my care. I agree with the treatment plan and certify that this therapy is necessary.    Physician's Signature:_________________________   DATE:_________

## 2024-06-19 ENCOUNTER — HOSPITAL ENCOUNTER (OUTPATIENT)
Facility: HOSPITAL | Age: 69
Setting detail: RECURRING SERIES
Discharge: HOME | End: 2024-06-22
Payer: MEDICARE

## 2024-06-19 PROCEDURE — 97140 MANUAL THERAPY 1/> REGIONS: CPT | Performed by: PHYSICAL THERAPIST

## 2024-06-19 PROCEDURE — 97110 THERAPEUTIC EXERCISES: CPT | Performed by: PHYSICAL THERAPIST

## 2024-06-19 PROCEDURE — 97035 APP MDLTY 1+ULTRASOUND EA 15: CPT | Performed by: PHYSICAL THERAPIST

## 2024-06-19 NOTE — PROGRESS NOTES
PHYSICAL THERAPY - MEDICARE DAILY TREATMENT NOTE (updated 3/23)      Date: 2024          Patient Name:  Rasta Gutierrez :  1955   Medical   Diagnosis:   M54.31 SCIATICA, RIGHT     SIDE. Treatment Diagnosis:  M54.31  SCIATICA, RIGHT SIDE    Referral Source:  Arlene Clark MD Insurance:   Payor: MEDICARE / Plan: MEDICARE PART A AND B / Product Type: *No Product type* /                     Patient  verified yes     Visit #   Current  / Total 2 POC exp 2024)   Time   In / Out 1010 am  1100 am    Total Treatment Time 50 minutes   Total Timed Codes 43 minutes   1:1 Treatment Time 43 minutes      [facility] Totals Reminder:  bill using total billable   min of TIMED therapeutic procedures and modalities.   8-22 min = 1 unit; 23-37 min = 2 units; 38-52 min = 3 units; 53-67 min = 4 units; 68-82 min = 5 units            SUBJECTIVE    Pain Level (0-10 scale): 1    Any medication changes, allergies to medications, adverse drug reactions, diagnosis change, or new procedure performed?: [x] No    [] Yes (see summary sheet for update)  Medications: Verified on Patient Summary List    Subjective functional status/changes:       Back pain comes and goes.  Not sure what triggers it.       OBJECTIVE      Therapeutic Procedures:  Tx Min Billable or 1:1 Min (if diff from Tx Min) Procedure, Rationale, Specifics   27  55059 Therapeutic Exercise (timed):  increase ROM, strength, coordination, balance, and proprioception to improve patient's ability to progress to PLOF and address remaining functional goals. (see flow sheet as applicable)     Details if applicable:     10  48192 Manual Therapy (timed):  decrease pain, increase ROM, and increase tissue extensibility to improve patient's ability to progress to PLOF and address remaining functional goals.  The manual therapy interventions were performed at a separate and distinct time from the therapeutic activities interventions . (see flow sheet as applicable)     Details

## 2024-06-21 ENCOUNTER — HOSPITAL ENCOUNTER (OUTPATIENT)
Facility: HOSPITAL | Age: 69
Setting detail: RECURRING SERIES
Discharge: HOME | End: 2024-06-24
Payer: MEDICARE

## 2024-06-21 PROCEDURE — 97035 APP MDLTY 1+ULTRASOUND EA 15: CPT | Performed by: PHYSICAL THERAPIST

## 2024-06-21 PROCEDURE — 97110 THERAPEUTIC EXERCISES: CPT | Performed by: PHYSICAL THERAPIST

## 2024-06-21 PROCEDURE — 97140 MANUAL THERAPY 1/> REGIONS: CPT | Performed by: PHYSICAL THERAPIST

## 2024-06-21 NOTE — PROGRESS NOTES
PHYSICAL THERAPY - MEDICARE DAILY TREATMENT NOTE (updated 3/23)      Date: 2024          Patient Name:  Rasta Gutierrez :  1955   Medical   Diagnosis:   M54.31 SCIATICA, RIGHT     SIDE. Treatment Diagnosis:  M54.31  SCIATICA, RIGHT SIDE    Referral Source:  Arlene Clark MD Insurance:   Payor: MEDICARE / Plan: MEDICARE PART A AND B / Product Type: *No Product type* /                     Patient  verified yes     Visit #   Current  / Total 3 POC exp 2024)   Time   In / Out 830 am  915 am    Total Treatment Time 45  minutes   Total Timed Codes 43 minutes   1:1 Treatment Time 43 minutes      [facility] Totals Reminder:  bill using total billable   min of TIMED therapeutic procedures and modalities.   8-22 min = 1 unit; 23-37 min = 2 units; 38-52 min = 3 units; 53-67 min = 4 units; 68-82 min = 5 units            SUBJECTIVE    Pain Level (0-10 scale): 1    Any medication changes, allergies to medications, adverse drug reactions, diagnosis change, or new procedure performed?: [x] No    [] Yes (see summary sheet for update)  Medications: Verified on Patient Summary List    Subjective functional status/changes:       Back pain comes and goes.  Not sure what triggers it.       OBJECTIVE      Therapeutic Procedures:  Tx Min Billable or 1:1 Min (if diff from Tx Min) Procedure, Rationale, Specifics   27  00415 Therapeutic Exercise (timed):  increase ROM, strength, coordination, balance, and proprioception to improve patient's ability to progress to PLOF and address remaining functional goals. (see flow sheet as applicable)     Details if applicable:     10  99787 Manual Therapy (timed):  decrease pain, increase ROM, and increase tissue extensibility to improve patient's ability to progress to PLOF and address remaining functional goals.  The manual therapy interventions were performed at a separate and distinct time from the therapeutic activities interventions . (see flow sheet as applicable)     Details if

## 2024-06-25 ENCOUNTER — HOSPITAL ENCOUNTER (OUTPATIENT)
Facility: HOSPITAL | Age: 69
Setting detail: RECURRING SERIES
Discharge: HOME | End: 2024-06-28
Payer: MEDICARE

## 2024-06-25 PROCEDURE — 97035 APP MDLTY 1+ULTRASOUND EA 15: CPT | Performed by: PHYSICAL THERAPIST

## 2024-06-25 PROCEDURE — 97140 MANUAL THERAPY 1/> REGIONS: CPT | Performed by: PHYSICAL THERAPIST

## 2024-06-25 PROCEDURE — 97110 THERAPEUTIC EXERCISES: CPT | Performed by: PHYSICAL THERAPIST

## 2024-06-25 NOTE — PROGRESS NOTES
applicable)     Details if applicable:  STM/PA glides lower lumbar, right.          Details if applicable:           Details if applicable:            Details if applicable:     36     Total Total         Modalities Rationale:     increase tissue extensibility to improve patient's ability to progress to PLOF and address remaining functional goals.       min [] Estim Unattended,             type/location:       []  w/ice    []  w/heat        min [] Estim Attended,             type/location:       []  w/ice   []  w/heat         []  w/US   []  TENS insruct            min []  Mechanical Traction,        type/lbs:        []  pro      []  sup           []  int       []  cont            []  before manual           []  after manual    6 min [x]  Ultrasound,         settings/location:  right lower lumbar, 1.5 w/cm2 , 100%, 1 mz  (right sidelying)     min  unbilled []  Ice     []  Heat            location/position:         min []  Vasopneumatic Device,      press/temp:   pre-treatment girth :    post-treatment girth :    measured at (landmark       location) :   If using vaso (only need to measure limb vaso being performed on)       6 min []  Other:      Skin assessment post-treatment (if applicable):    [x]  intact    []  redness- no adverse reaction                 []redness - adverse reaction:          [x]  Patient Education billed concurrently with other procedures   [x] Review HEP    [] Progressed/Changed HEP, detail: no  changes to today's program.     [] Other detail:         Other Objective/Functional Measures  Gait WNL .   Right hip ER restrictions when compared to the left.       Pain Level at end of session (0-10 scale): 1      Assessment   Good return demonstration of today's program.  HEP issued with written instructions.  No change in pain report post session.   Mild stiffness is primary complaint.   Patient will continue to benefit from skilled PT / OT services to modify and progress therapeutic interventions,

## 2024-06-27 ENCOUNTER — HOSPITAL ENCOUNTER (OUTPATIENT)
Facility: HOSPITAL | Age: 69
Setting detail: RECURRING SERIES
Discharge: HOME | End: 2024-06-30
Payer: MEDICARE

## 2024-06-27 PROCEDURE — 97035 APP MDLTY 1+ULTRASOUND EA 15: CPT | Performed by: PHYSICAL THERAPIST

## 2024-06-27 PROCEDURE — 97110 THERAPEUTIC EXERCISES: CPT | Performed by: PHYSICAL THERAPIST

## 2024-06-27 PROCEDURE — 97140 MANUAL THERAPY 1/> REGIONS: CPT | Performed by: PHYSICAL THERAPIST

## 2024-06-27 NOTE — PROGRESS NOTES
PHYSICAL THERAPY - MEDICARE DAILY TREATMENT NOTE (updated 3/23)      Date: 2024          Patient Name:  Rasta Gutierrez :  1955   Medical   Diagnosis:   M54.31 SCIATICA, RIGHT     SIDE. Treatment Diagnosis:  M54.31  SCIATICA, RIGHT SIDE    Referral Source:  Arlene Clark MD Insurance:   Payor: MEDICARE / Plan: MEDICARE PART A AND B / Product Type: *No Product type* /                     Patient  verified yes     Visit #   Current  / Total 5 POC exp 2024)   Time   In / Out 930 am  1020am    Total Treatment Time  50  minutes   Total Timed Codes 46 minutes   1:1 Treatment Time 46 minutes      [facility] Totals Reminder:  bill using total billable   min of TIMED therapeutic procedures and modalities.   8-22 min = 1 unit; 23-37 min = 2 units; 38-52 min = 3 units; 53-67 min = 4 units; 68-82 min = 5 units            SUBJECTIVE    Pain Level (0-10 scale): 1    Any medication changes, allergies to medications, adverse drug reactions, diagnosis change, or new procedure performed?: [x] No    [] Yes (see summary sheet for update)  Medications: Verified on Patient Summary List    Subjective functional status/changes:        No change in back symptoms.   Persistent episodic pain into LE s .  Occurs with prolonged driving and walking up inclines.       OBJECTIVE      Therapeutic Procedures:  Tx Min Billable or 1:1 Min (if diff from Tx Min) Procedure, Rationale, Specifics   30  45705 Therapeutic Exercise (timed):  increase ROM, strength, coordination, balance, and proprioception to improve patient's ability to progress to PLOF and address remaining functional goals. (see flow sheet as applicable)     Details if applicable:     10  36425 Manual Therapy (timed):  decrease pain, increase ROM, and increase tissue extensibility to improve patient's ability to progress to PLOF and address remaining functional goals.  The manual therapy interventions were performed at a separate and distinct time from the therapeutic

## 2024-07-02 ENCOUNTER — HOSPITAL ENCOUNTER (OUTPATIENT)
Facility: HOSPITAL | Age: 69
Setting detail: RECURRING SERIES
Discharge: HOME | End: 2024-07-05
Payer: MEDICARE

## 2024-07-02 PROCEDURE — 97110 THERAPEUTIC EXERCISES: CPT | Performed by: PHYSICAL THERAPIST

## 2024-07-02 PROCEDURE — 97140 MANUAL THERAPY 1/> REGIONS: CPT | Performed by: PHYSICAL THERAPIST

## 2024-07-02 PROCEDURE — 97035 APP MDLTY 1+ULTRASOUND EA 15: CPT | Performed by: PHYSICAL THERAPIST

## 2024-07-02 NOTE — PROGRESS NOTES
Other Objective/Functional Measures  Gait WNL .   Right hip ER restrictions when compared to the left.       Pain Level at end of session (0-10 scale): 1      Assessment   PT continues to provide core stabilization for a transition to home.   Continue x 2 more weeks and transition to HEP.   Patient will continue to benefit from skilled PT / OT services to modify and progress therapeutic interventions, analyze and address functional mobility deficits, analyze and address ROM deficits, and analyze and cue for proper movement patterns to address functional deficits and attain remaining goals.    Progress toward goals / Updated goals:  []  See Progress Note/Recertification    Short Term Goals: To be accomplished in 8 treatments.   Independent with HEP , progressing 6/27/2024   Patient reports pain < 2/10 with routine ADL   Long Term Goals: To be accomplished in 24 treatments.   Restore functional TL AROM:  patient can put on socks,  objects off floor.    Improve Oswestry by > 9 points .     Pain < 2/10 with resumption of gym program.      PLAN  Yes  Continue plan of care  Re-Cert Due: 9/13/2024  [x]  Upgrade activities as tolerated  []  Discharge due to:  []  Other:      Sapphire Waite, PT       7/2/2024       9:13 AM

## 2024-07-03 ENCOUNTER — HOSPITAL ENCOUNTER (OUTPATIENT)
Facility: HOSPITAL | Age: 69
Setting detail: RECURRING SERIES
Discharge: HOME | End: 2024-07-06
Payer: MEDICARE

## 2024-07-03 PROCEDURE — 97140 MANUAL THERAPY 1/> REGIONS: CPT | Performed by: PHYSICAL THERAPIST

## 2024-07-03 PROCEDURE — 97110 THERAPEUTIC EXERCISES: CPT | Performed by: PHYSICAL THERAPIST

## 2024-07-03 NOTE — PROGRESS NOTES
[facility]  [address], [address]  [place] [address]  Phone: [phone]    Fax: [phone]    PHYSICAL THERAPY PROGRESS NOTE  Patient Name:  Rasta Gutierrez :  1955   Treatment/Medical Diagnosis: Rotator cuff tendinitis, right [M75.81]   Referral Source:  Arlene Clark MD     Date of Initial Visit:  2024 Attended Visits:  8 Missed Visits:  0     SUMMARY OF TREATMENT/ASSESSMENT:  Core stabilization, TL /hip stretching.     CURRENT STATUS/GOALS  Short Term Goals: To be accomplished in 8 treatments.   Independent with HEP , progressing 2024   Patient reports pain < 2/10 with routine ADL , partially achieved 2024  Long Term Goals: To be accomplished in 24 treatments.   Restore functional TL AROM:  patient can put on socks,  objects off floor.    Improve Oswestry by > 9 points .     Pain < 2/10 with resumption of gym program, not achieved 7/3/20      RECOMMENDATIONS FOR SKILLED THERAPY  Transition to HEP after the next 2 weeks.         Sapphire Waite, PT       7/3/2024       11:21 AM    If you have any questions/comments please contact us directly at [phone].   Thank you for allowing us to assist in the care of your patient.    
analyze and cue for proper movement patterns to address functional deficits and attain remaining goals.    Progress toward goals / Updated goals:  []  See Progress Note/Recertification    Short Term Goals: To be accomplished in 8 treatments.   Independent with HEP , progressing 6/27/2024   Patient reports pain < 2/10 with routine ADL , partially achieved 7/23/2024  Long Term Goals: To be accomplished in 24 treatments.   Restore functional TL AROM:  patient can put on socks,  objects off floor.    Improve Oswestry by > 9 points .     Pain < 2/10 with resumption of gym program, not achieved 7/3/2024     PLAN  Yes  Continue plan of care  Re-Cert Due: 9/13/2024  [x]  Upgrade activities as tolerated  []  Discharge due to:  []  Other:      Sapphire Waite, PT       7/3/2024       11:17 AM

## 2024-07-09 ENCOUNTER — HOSPITAL ENCOUNTER (OUTPATIENT)
Facility: HOSPITAL | Age: 69
Setting detail: RECURRING SERIES
Discharge: HOME | End: 2024-07-12
Payer: MEDICARE

## 2024-07-09 PROCEDURE — 97140 MANUAL THERAPY 1/> REGIONS: CPT | Performed by: PHYSICAL THERAPIST

## 2024-07-09 PROCEDURE — 97110 THERAPEUTIC EXERCISES: CPT | Performed by: PHYSICAL THERAPIST
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
## 2024-07-11 ENCOUNTER — HOSPITAL ENCOUNTER (OUTPATIENT)
Facility: HOSPITAL | Age: 69
Setting detail: RECURRING SERIES
Discharge: HOME | End: 2024-07-14
Payer: MEDICARE

## 2024-07-11 PROCEDURE — 97140 MANUAL THERAPY 1/> REGIONS: CPT | Performed by: PHYSICAL THERAPIST

## 2024-07-11 PROCEDURE — 97110 THERAPEUTIC EXERCISES: CPT | Performed by: PHYSICAL THERAPIST
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
## 2024-07-16 ENCOUNTER — HOSPITAL ENCOUNTER (OUTPATIENT)
Facility: HOSPITAL | Age: 69
Setting detail: RECURRING SERIES
Discharge: HOME | End: 2024-07-19
Payer: MEDICARE

## 2024-07-16 PROCEDURE — 97140 MANUAL THERAPY 1/> REGIONS: CPT | Performed by: PHYSICAL THERAPIST

## 2024-07-16 PROCEDURE — 97110 THERAPEUTIC EXERCISES: CPT | Performed by: PHYSICAL THERAPIST

## 2024-07-16 NOTE — PROGRESS NOTES
PHYSICAL THERAPY - MEDICARE DAILY TREATMENT NOTE (updated 3/23)      Date: 2024          Patient Name:  Rasta Gutierrez :  1955   Medical   Diagnosis:   M54.31 SCIATICA, RIGHT     SIDE. Treatment Diagnosis:  M54.31  SCIATICA, RIGHT SIDE    Referral Source:  Arlene Clark MD Insurance:   Payor: MEDICARE / Plan: MEDICARE PART A AND B / Product Type: *No Product type* /                     Patient  verified yes     Visit #   Current  / Total 9 POC exp 2024)   Time   In / Out 57967 am  1115 am    Total Treatment Time  45  minutes   Total Timed Codes 38 minutes   1:1 Treatment Time 38 minutes      [facility] Totals Reminder:  bill using total billable   min of TIMED therapeutic procedures and modalities.   8-22 min = 1 unit; 23-37 min = 2 units; 38-52 min = 3 units; 53-67 min = 4 units; 68-82 min = 5 units            SUBJECTIVE    Pain Level (0-10 scale): 0    Any medication changes, allergies to medications, adverse drug reactions, diagnosis change, or new procedure performed?: [x] No    [] Yes (see summary sheet for update)  Medications: Verified on Patient Summary List    Subjective functional status/changes:       Back is doing pretty good.    Reports that placing a rolled towel at low back when driving helps reduce left leg pain.     Therapeutic Procedures:  Tx Min Billable or 1:1 Min (if diff from Tx Min) Procedure, Rationale, Specifics   30  67467 Therapeutic Exercise (timed):  increase ROM, strength, coordination, balance, and proprioception to improve patient's ability to progress to PLOF and address remaining functional goals. (see flow sheet as applicable)     Details if applicable:     10  18626 Manual Therapy (timed):  decrease pain, increase ROM, and increase tissue extensibility to improve patient's ability to progress to PLOF and address remaining functional goals.  The manual therapy interventions were performed at a separate and distinct time from the therapeutic activities

## 2024-07-18 ENCOUNTER — HOSPITAL ENCOUNTER (OUTPATIENT)
Facility: HOSPITAL | Age: 69
Setting detail: RECURRING SERIES
Discharge: HOME | End: 2024-07-21
Payer: MEDICARE

## 2024-07-18 PROCEDURE — 97140 MANUAL THERAPY 1/> REGIONS: CPT | Performed by: PHYSICAL THERAPIST

## 2024-07-18 PROCEDURE — 97110 THERAPEUTIC EXERCISES: CPT | Performed by: PHYSICAL THERAPIST

## 2024-07-18 NOTE — PROGRESS NOTES
PHYSICAL THERAPY - MEDICARE DAILY TREATMENT NOTE (updated 3/23)      Date: 2024          Patient Name:  Rasta Gutierrez :  1955   Medical   Diagnosis:   M54.31 SCIATICA, RIGHT     SIDE. Treatment Diagnosis:  M54.31  SCIATICA, RIGHT SIDE    Referral Source:  Arlene Clark MD Insurance:   Payor: MEDICARE / Plan: MEDICARE PART A AND B / Product Type: *No Product type* /                     Patient  verified yes     Visit #   Current  / Total 11 POC exp 2024)   Time   In / Out 730 am  815 am    Total Treatment Time  45  minutes   Total Timed Codes 40 minutes   1:1 Treatment Time 40 minutes      [facility] Totals Reminder:  bill using total billable   min of TIMED therapeutic procedures and modalities.   8-22 min = 1 unit; 23-37 min = 2 units; 38-52 min = 3 units; 53-67 min = 4 units; 68-82 min = 5 units            SUBJECTIVE    Pain Level (0-10 scale): 0    Any medication changes, allergies to medications, adverse drug reactions, diagnosis change, or new procedure performed?: [x] No    [] Yes (see summary sheet for update)  Medications: Verified on Patient Summary List    Subjective functional status/changes:       This is the last day.    Patient reports its hard to determine status of back  due to taking Diclofanac/Gabapentin.    Persistent left legs symptoms with sitting and walking up inclines.     Therapeutic Procedures:  Tx Min Billable or 1:1 Min (if diff from Tx Min) Procedure, Rationale, Specifics   30  49817 Therapeutic Exercise (timed):  increase ROM, strength, coordination, balance, and proprioception to improve patient's ability to progress to PLOF and address remaining functional goals. (see flow sheet as applicable)     Details if applicable:     10  33956 Manual Therapy (timed):  decrease pain, increase ROM, and increase tissue extensibility to improve patient's ability to progress to PLOF and address remaining functional goals.  The manual therapy interventions were performed at a

## 2024-07-19 NOTE — PROGRESS NOTES
[facility]  [address], [address]  [place] [address]  Phone: [phone]    Fax: [phone]    DISCHARGE SUMMARY  Patient Name: Rasta Gutierrez : 1955   Treatment/Medical Diagnosis: Rotator cuff tendinitis, right [M75.81]   Referral Source: Arlene Clark MD     Date of Initial Visit: 2024 Attended Visits: 11 Missed Visits: 0     SUMMARY OF TREATMENT  Conservative care for chronic LBP with right LE pain     CURRENT STATUS  Short Term Goals: To be accomplished in 8 treatments.   Independent with HEP , progressing 2024   Patient reports pain < 2/10 with routine ADL , partially achieved 2024  Long Term Goals: To be accomplished in 24 treatments.   Restore functional TL AROM:  patient can put on socks,  objects off floor.  Partially achieved 2023   Improve Oswestry by > 9 points .  (N/a)   Pain < 2/10 with resumption of gym program, not achieved 7/3/2024    RECOMMENDATIONS  Discontinue therapy due to lack of appreciable progress towards goals.        Sapphire Waite, PT       2024       11:47 AM    If you have any questions/comments please contact us directly at [phone].   Thank you for allowing us to assist in the care of your patient.

## 2024-08-21 ENCOUNTER — HOSPITAL ENCOUNTER (OUTPATIENT)
Facility: HOSPITAL | Age: 69
Setting detail: RECURRING SERIES
Discharge: HOME | End: 2024-08-24
Payer: MEDICARE

## 2024-08-21 PROCEDURE — 97161 PT EVAL LOW COMPLEX 20 MIN: CPT | Performed by: PHYSICAL THERAPIST

## 2024-08-21 PROCEDURE — 97140 MANUAL THERAPY 1/> REGIONS: CPT | Performed by: PHYSICAL THERAPIST

## 2024-08-21 PROCEDURE — 97110 THERAPEUTIC EXERCISES: CPT | Performed by: PHYSICAL THERAPIST

## 2024-08-21 NOTE — THERAPY EVALUATION
[facility]  [address], [address]  [place] [address]  Phone: [phone]    Fax: [phone]         PHYSICAL THERAPY - MEDICARE EVALUATION/PLAN OF CARE NOTE (updated 3/23)      Date: 2024          Patient Name:  Rasta Gutierrez :  1955   Medical   Diagnosis:  History of arthroscopy of right shoulder [Z98.890] Treatment Diagnosis:  M25.511  RIGHT SHOULDER PAIN    Referral Source:  Arlene Clark MD Provider #:  [id number]                Insurance: Payor: MEDICARE / Plan: MEDICARE PART A AND B / Product Type: *No Product type* /      Patient  verified yes     Visit #   Current  / Total 1 KX   Time   In / Out 815 am  930 am    Total Treatment Time 45 minutes   Total Timed Codes 25 minutes   1:1 Treatment Time 25      [facility] Totals Reminder:  bill using total billable   min of TIMED therapeutic procedures and modalities.   8-22 min = 1 unit; 23-37 min = 2 units; 38-52 min = 3 units;  53-67 min = 4 units; 68-82 min = 5 units       SUBJECTIVE  Pain Level (0-10 scale): /10, Diclofenac. Gabapentin ( 300 mg to sleep)       []constant []intermittent []improving []worsening []no change since onset    Any medication changes, allergies to medications, adverse drug reactions, diagnosis change, or new procedure performed?: [x] No    [] Yes (see summary sheet for update)  Medications: Verified on Patient Summary List    Subjective functional status/changes:       S/p right biceps tenodesis.   2024.  Sleeping in a recliner.  Patient he is right hand dominate.   Only wearing sling while going out in the community .  He also told me not to tighten my biceps.      Start of Care: 2024  Onset Date: insidious onset 2024.     Current symptoms/Complaints: ache.   Mechanism of Injury: n/a  PLOF: limited due to pain.   Limitations to PLOF/Activity or Recreational Limitations:  d/c sling except in crowds.     Work Hx: recently retired   Living

## 2024-08-28 ENCOUNTER — HOSPITAL ENCOUNTER (OUTPATIENT)
Facility: HOSPITAL | Age: 69
Setting detail: RECURRING SERIES
Discharge: HOME | End: 2024-08-31
Payer: MEDICARE

## 2024-08-28 PROCEDURE — 97110 THERAPEUTIC EXERCISES: CPT | Performed by: PHYSICAL THERAPIST

## 2024-08-28 PROCEDURE — 97140 MANUAL THERAPY 1/> REGIONS: CPT | Performed by: PHYSICAL THERAPIST

## 2024-08-28 NOTE — PROGRESS NOTES
PHYSICAL THERAPY - MEDICARE DAILY TREATMENT NOTE (updated 3/23)      Date: 2024          Patient Name:  Rasta Gutierrez :  1955   Medical   Diagnosis:  History of arthroscopy of right shoulder [Z98.890] Treatment Diagnosis:  M25.511  RIGHT SHOULDER PAIN    Referral Source:  Arlene Clark MD Insurance:   Payor: MEDICARE / Plan: MEDICARE PART A AND B / Product Type: *No Product type* /                     Patient  verified yes     Visit #   Current  / Total 1 8 visits   Time   In / Out 800 am  845 am    Total Treatment Time 45   Total Timed Codes 45   1:1 Treatment Time 45      [facility] Totals Reminder:  bill using total billable   min of TIMED therapeutic procedures and modalities.   8-22 min = 1 unit; 23-37 min = 2 units; 38-52 min = 3 units; 53-67 min = 4 units; 68-82 min = 5 units            SUBJECTIVE    Pain Level (0-10 scale): 4    Any medication changes, allergies to medications, adverse drug reactions, diagnosis change, or new procedure performed?: [x] No    [] Yes (see summary sheet for update)  Medications: Verified on Patient Summary List    Subjective functional status/changes:     I am alittle sore.  Did some yard work yesterday.  Maybe I overdid it.     OBJECTIVE      Therapeutic Procedures:  Tx Min Billable or 1:1 Min (if diff from Tx Min) Procedure, Rationale, Specifics   30  75168 Therapeutic Exercise (timed):  increase ROM, strength, coordination, balance, and proprioception to improve patient's ability to progress to PLOF and address remaining functional goals. (see flow sheet as applicable)     Details if applicable:     15  57830 Manual Therapy (timed):  decrease pain, increase ROM, and increase tissue extensibility to improve patient's ability to progress to PLOF and address remaining functional goals.  The manual therapy interventions were performed at a separate and distinct time from the therapeutic activities interventions . (see flow sheet as applicable)     Details if  applicable:       {[facility] There Procedures:46893}    Details if applicable:       {[facility] There Procedures:70346}    Details if applicable:       {[facility] There Procedures:86480}     Details if applicable:     45 0    Total Total       [x]  Patient Education billed concurrently with other procedures   [x] Review HEP    [] Progressed/Changed HEP, detail:Access Code: [email or web address]  URL: [email or web address]  Date: 08/28/2024  Prepared by: Sapphire Antonio    Exercises  - Isometric Shoulder Extension at Wall  - 1 x daily - 7 x weekly - 3 sets - 10 reps  - Isometric Shoulder Flexion at Wall  - 1 x daily - 7 x weekly - 3 sets - 10 reps  - Isometric Shoulder Abduction at Wall  - 1 x daily - 7 x weekly - 3 sets - 10 reps  - Isometric Shoulder External Rotation at Wall  - 1 x daily - 7 x weekly - 3 sets - 10 reps  - Standing Isometric Shoulder Internal Rotation with Towel Roll at Doorway  - 1 x daily - 7 x weekly - 3 sets - 10 reps  [] Other detail:         Other Objective/Functional Measures  0    Pain Level at end of session (0-10 scale): 4      Assessment   Good return of today's program.  Mild soreness.  HEP updated with return demonstration.   No adverse reaction.   Patient will continue to benefit from skilled PT / OT services to modify and progress therapeutic interventions, analyze and address functional mobility deficits, analyze and address ROM deficits, analyze and address soft tissue restrictions, and analyze and cue for proper movement patterns to address functional deficits and attain remaining goals.    Progress toward goals / Updated goals:  []  See Progress Note/Recertification    Short Term Goals: To be accomplished in 8 treatments.  Independent with HEP  Passive right shoulder motion = b/l.   Pain <2/10 with routine.           PLAN  Yes  Continue plan of care  Re-Cert Due: 11/20/2024  [x]  Upgrade activities as tolerated  []  Discharge due to:  []  Other:      Sapphire Waite, PT       8/28/2024

## 2024-08-29 NOTE — PROGRESS NOTES
PHYSICAL THERAPY - DAILY TREATMENT NOTE (updated 3/23)      Date: 2024          Patient Name:  Rasta Gutierrez :  1955   Medical   Diagnosis:  Rotator cuff tendinitis, right [M75.81] Treatment Diagnosis:  M25.511  RIGHT SHOULDER PAIN    Referral Source:  Arlene Clark MD Insurance:   Payor: MEDICARE / Plan: MEDICARE PART A AND B / Product Type: *No Product type* /                     Patient  verified yes     Visit #   Current  / Total 11 20   Time   In / Out 930 1015   Total Treatment Time 45   Total Timed Codes 25         SUBJECTIVE    Pain Level (0-10 scale): more sx over weekend; in both arms and neck hurts. Maybe I am missing the traction.    Any medication changes, allergies to medications, adverse drug reactions, diagnosis change, or new procedure performed?: [x] No    [] Yes (see summary sheet for update)  Medications: Verified on Patient Summary List    Subjective functional status/changes:         OBJECTIVE      Therapeutic Procedures:  Tx Min Billable or 1:1 Min (if diff from Tx Min) Procedure, Rationale, Specifics   25  51295 Therapeutic Exercise (timed):  increase ROM, strength, coordination, balance, and proprioception to improve patient's ability to progress to PLOF and address remaining functional goals. (see flow sheet as applicable)     Details if applicable:       60021 Manual Therapy (timed):  decrease pain, increase ROM, increase tissue extensibility, decrease edema, and decrease trigger points to improve patient's ability to progress to PLOF and address remaining functional goals.  The manual therapy interventions were performed at a separate and distinct time from the therapeutic activities interventions . (see flow sheet as applicable)     Details if applicable:  STM to R UT and cervical paraspinal muscle          Details if applicable:           Details if applicable:            Details if applicable:     25     Total Total     Modalities Rationale:     decrease pain and  02-Aug-2024

## 2024-09-05 ENCOUNTER — HOSPITAL ENCOUNTER (OUTPATIENT)
Facility: HOSPITAL | Age: 69
Setting detail: RECURRING SERIES
Discharge: HOME | End: 2024-09-08
Payer: MEDICARE

## 2024-09-05 PROCEDURE — 97140 MANUAL THERAPY 1/> REGIONS: CPT | Performed by: PHYSICAL THERAPIST

## 2024-09-12 ENCOUNTER — HOSPITAL ENCOUNTER (OUTPATIENT)
Facility: HOSPITAL | Age: 69
Setting detail: RECURRING SERIES
Discharge: HOME | End: 2024-09-15
Payer: MEDICARE

## 2024-09-12 PROCEDURE — 97110 THERAPEUTIC EXERCISES: CPT | Performed by: PHYSICAL THERAPIST

## 2024-09-12 PROCEDURE — 97140 MANUAL THERAPY 1/> REGIONS: CPT | Performed by: PHYSICAL THERAPIST

## 2024-09-19 ENCOUNTER — HOSPITAL ENCOUNTER (OUTPATIENT)
Facility: HOSPITAL | Age: 69
Setting detail: RECURRING SERIES
Discharge: HOME | End: 2024-09-22
Payer: MEDICARE

## 2024-09-19 PROCEDURE — 97110 THERAPEUTIC EXERCISES: CPT | Performed by: PHYSICAL THERAPIST

## 2024-09-19 PROCEDURE — 97140 MANUAL THERAPY 1/> REGIONS: CPT | Performed by: PHYSICAL THERAPIST

## 2024-09-26 ENCOUNTER — HOSPITAL ENCOUNTER (OUTPATIENT)
Facility: HOSPITAL | Age: 69
Setting detail: RECURRING SERIES
Discharge: HOME | End: 2024-09-29
Payer: MEDICARE

## 2024-09-26 PROCEDURE — 97110 THERAPEUTIC EXERCISES: CPT | Performed by: PHYSICAL THERAPIST

## 2024-09-26 PROCEDURE — 97140 MANUAL THERAPY 1/> REGIONS: CPT | Performed by: PHYSICAL THERAPIST

## 2024-09-26 NOTE — PROGRESS NOTES
PHYSICAL THERAPY - MEDICARE DAILY TREATMENT NOTE (updated 3/23)      Date: 2024          Patient Name:  Rasta Gutierrez :  1955   Medical   Diagnosis:  History of arthroscopy of right shoulder [Z98.890] Treatment Diagnosis:  M25.511  RIGHT SHOULDER PAIN    Referral Source:  Arlene Clark MD Insurance:   Payor: MEDICARE / Plan: MEDICARE PART A AND B / Product Type: *No Product type* /                     Patient  verified yes     Visit #   Current  / Total 6 8 visits   Time   In / Out 845 am  935 am    Total Treatment Time 45 minutes   Total Timed Codes    1:1 Treatment Time       [facility] Totals Reminder:  bill using total billable   min of TIMED therapeutic procedures and modalities.   8-22 min = 1 unit; 23-37 min = 2 units; 38-52 min = 3 units; 53-67 min = 4 units; 68-82 min = 5 units            SUBJECTIVE    Pain Level (0-10 scale): 0    Any medication changes, allergies to medications, adverse drug reactions, diagnosis change, or new procedure performed?: [x] No    [] Yes (see summary sheet for update)  Medications: Verified on Patient Summary List    Subjective functional status/changes:       Right arm is pretty much pain free.   My left shoulder is beginning to hurt.       OBJECTIVE      Therapeutic Procedures:  Tx Min Billable or 1:1 Min (if diff from Tx Min) Procedure, Rationale, Specifics   30  93826 Therapeutic Exercise (timed):  increase ROM, strength, coordination, balance, and proprioception to improve patient's ability to progress to PLOF and address remaining functional goals. (see flow sheet as applicable)     Details if applicable:     15  58565 Manual Therapy (timed):  decrease pain, increase ROM, and increase tissue extensibility to improve patient's ability to progress to PLOF and address remaining functional goals.  The manual therapy interventions were performed at a separate and distinct time from the therapeutic activities interventions . (see flow sheet as applicable)

## 2024-10-20 ENCOUNTER — HOSPITAL ENCOUNTER (OUTPATIENT)
Facility: HOSPITAL | Age: 69
Discharge: HOME | End: 2024-10-23
Attending: INTERNAL MEDICINE
Payer: MEDICARE

## 2024-10-20 DIAGNOSIS — R91.1 SOLITARY PULMONARY NODULE: ICD-10-CM

## 2024-10-20 PROCEDURE — 71250 CT THORAX DX C-: CPT

## 2024-10-31 ENCOUNTER — OFFICE VISIT (OUTPATIENT)
Age: 69
End: 2024-10-31
Payer: MEDICARE

## 2024-10-31 VITALS
WEIGHT: 206 LBS | OXYGEN SATURATION: 95 % | HEART RATE: 47 BPM | HEIGHT: 69 IN | SYSTOLIC BLOOD PRESSURE: 150 MMHG | BODY MASS INDEX: 30.51 KG/M2 | DIASTOLIC BLOOD PRESSURE: 80 MMHG

## 2024-10-31 DIAGNOSIS — R03.0 ELEVATED BLOOD PRESSURE READING: ICD-10-CM

## 2024-10-31 DIAGNOSIS — R53.83 FATIGUE, UNSPECIFIED TYPE: ICD-10-CM

## 2024-10-31 DIAGNOSIS — I49.5 SSS (SICK SINUS SYNDROME) (HCC): Primary | ICD-10-CM

## 2024-10-31 PROCEDURE — 99203 OFFICE O/P NEW LOW 30 MIN: CPT | Performed by: STUDENT IN AN ORGANIZED HEALTH CARE EDUCATION/TRAINING PROGRAM

## 2024-10-31 PROCEDURE — 93005 ELECTROCARDIOGRAM TRACING: CPT | Performed by: STUDENT IN AN ORGANIZED HEALTH CARE EDUCATION/TRAINING PROGRAM

## 2024-10-31 NOTE — PROGRESS NOTES
[place]  [address], [address]  [place] [address]    Office [phone],Fax [phone]           Rasta Gutierrez is a 69 y.o. male presents to the office for evaluation of slow heart rate      Assessment/Recommendations:     Diagnosis Orders   1. SSS (sick sinus syndrome) ([facility])  Exercise stress test    Echo (TTE) complete (PRN contrast/bubble/strain/3D)    Amb External Referral To Pulmonology      2. Fatigue, unspecified type        3. Elevated blood pressure reading            Sick sinus syndrome.  Overall appears to have mild symptoms related to his bradycardia.  No history of syncope or near syncope.  Symptoms are mainly mild exercise intolerance and afternoon fatigue, however afternoon fatigue is likely could be related to obstructive sleep apnea rather than bradycardia.  Recommend to proceed with 7-day event monitor for further evaluation of amatory heart rhythm.  Echocardiogram.  Exercise stress test for chronotropic competence.  Sleep study for possible sleep apnea      Primary Care Physician- Siddhartha Mitchell MD    Follow-up after above testing        Subjective:  69-year-old gentleman presents to the office for evaluation of low heart rate.  He reports over previous visits and hospitalizations that he was told he had a low heart rate, therefore presents for follow-up evaluation.  Patient reports occasional fatigue and shortness of breath with activity.  He does report he falls asleep easily in the afternoon.  No history of syncope or near syncope.  No chest pain or chest pressure symptoms.  No exertional dyspnea.      Past Medical History:   Diagnosis Date    Asthma     mild intermittent    Basal cell carcinoma     face, chest? Dr Childers. saw Dr. Menjivar    Chronic sinus bradycardia     Current mild episode of major depressive disorder without prior episode ([facility]) 9/7/2018    Generalized headaches     2008 MRI normal    Insomnia     normal sleep study 2008

## 2024-10-31 NOTE — PROGRESS NOTES
had concerns including New Patient.    Vitals:    10/31/24 1424   BP: (!) 150/80   Site: Left Upper Arm   Position: Sitting   Pulse: (!) 47   SpO2: 95%   Weight: 93.4 kg (206 lb)   Height: 1.753 m (5' 9\")        Chest pain No    Refills No        1. Have you been to the ER, urgent care clinic since your last visit? No       Hospitalized since your last visit? No       Where?        Date?

## 2024-11-01 ENCOUNTER — TELEPHONE (OUTPATIENT)
Age: 69
End: 2024-11-01

## 2024-11-01 ENCOUNTER — ANCILLARY PROCEDURE (OUTPATIENT)
Age: 69
End: 2024-11-01
Payer: MEDICARE

## 2024-11-01 DIAGNOSIS — R00.1 BRADYCARDIA: ICD-10-CM

## 2024-11-01 PROCEDURE — 93229 REMOTE 30 DAY ECG TECH SUPP: CPT | Performed by: STUDENT IN AN ORGANIZED HEALTH CARE EDUCATION/TRAINING PROGRAM

## 2024-11-01 NOTE — TELEPHONE ENCOUNTER
----- Message from SHANE REZA RN sent at 2024  8:59 AM EDT -----  Regardin day monitor  I think I forgot to send you this yesterday.  Per Dr. Poe, 7 day event monitor for christine/ SSS    Thanks

## 2024-11-27 VITALS — HEIGHT: 69 IN | BODY MASS INDEX: 30.51 KG/M2 | WEIGHT: 206 LBS

## 2024-11-27 RX ORDER — GABAPENTIN 300 MG/1
300 CAPSULE ORAL NIGHTLY
COMMUNITY

## 2024-11-27 RX ORDER — METHYLPREDNISOLONE 4 MG/1
TABLET ORAL
COMMUNITY
Start: 2024-05-07

## 2024-11-27 RX ORDER — ACETAMINOPHEN 500 MG
1 TABLET ORAL EVERY 6 HOURS PRN
COMMUNITY

## 2024-11-27 RX ORDER — MELOXICAM 15 MG/1
1 TABLET ORAL
COMMUNITY
Start: 2024-05-07

## 2024-11-27 RX ORDER — HYDROCORTISONE ACETATE PRAMOXINE HCL 2.5; 1 G/100G; G/100G
CREAM TOPICAL
COMMUNITY

## 2024-11-27 RX ORDER — BUDESONIDE AND FORMOTEROL FUMARATE DIHYDRATE 160; 4.5 UG/1; UG/1
2 AEROSOL RESPIRATORY (INHALATION) 2 TIMES DAILY
COMMUNITY
Start: 2024-10-15

## 2024-11-27 RX ORDER — PREDNISONE 10 MG/1
TABLET ORAL
COMMUNITY
Start: 2024-11-17

## 2024-12-02 ENCOUNTER — OFFICE VISIT (OUTPATIENT)
Age: 69
End: 2024-12-02
Payer: MEDICARE

## 2024-12-02 DIAGNOSIS — I49.5 SICK SINUS SYNDROME (HCC): ICD-10-CM

## 2024-12-02 DIAGNOSIS — G47.33 OSA (OBSTRUCTIVE SLEEP APNEA): Primary | ICD-10-CM

## 2024-12-02 PROCEDURE — 1123F ACP DISCUSS/DSCN MKR DOCD: CPT | Performed by: SPECIALIST

## 2024-12-02 PROCEDURE — G8427 DOCREV CUR MEDS BY ELIG CLIN: HCPCS | Performed by: SPECIALIST

## 2024-12-02 PROCEDURE — 3017F COLORECTAL CA SCREEN DOC REV: CPT | Performed by: SPECIALIST

## 2024-12-02 PROCEDURE — 99203 OFFICE O/P NEW LOW 30 MIN: CPT | Performed by: SPECIALIST

## 2024-12-02 ASSESSMENT — SLEEP AND FATIGUE QUESTIONNAIRES
HOW LIKELY ARE YOU TO NOD OFF OR FALL ASLEEP WHILE SITTING AND TALKING TO SOMEONE: SLIGHT CHANCE OF DOZING
HOW LIKELY ARE YOU TO NOD OFF OR FALL ASLEEP WHEN YOU ARE A PASSENGER IN A CAR FOR AN HOUR WITHOUT A BREAK: HIGH CHANCE OF DOZING
ESS TOTAL SCORE: 18
HOW LIKELY ARE YOU TO NOD OFF OR FALL ASLEEP WHILE SITTING AND READING: HIGH CHANCE OF DOZING
HOW LIKELY ARE YOU TO NOD OFF OR FALL ASLEEP WHILE WATCHING TV: HIGH CHANCE OF DOZING
HOW LIKELY ARE YOU TO NOD OFF OR FALL ASLEEP IN A CAR, WHILE STOPPED FOR A FEW MINUTES IN TRAFFIC: SLIGHT CHANCE OF DOZING
HOW LIKELY ARE YOU TO NOD OFF OR FALL ASLEEP WHILE SITTING QUIETLY AFTER LUNCH WITHOUT ALCOHOL: MODERATE CHANCE OF DOZING
HOW LIKELY ARE YOU TO NOD OFF OR FALL ASLEEP WHILE LYING DOWN TO REST IN THE AFTERNOON WHEN CIRCUMSTANCES PERMIT: HIGH CHANCE OF DOZING
HOW LIKELY ARE YOU TO NOD OFF OR FALL ASLEEP WHILE SITTING INACTIVE IN A PUBLIC PLACE: MODERATE CHANCE OF DOZING

## 2024-12-02 NOTE — PROGRESS NOTES
Chief Complaint   Patient presents with    Sleep Problem     NP referred by his cardiologist Dr. Galo for sleep study. [place]. Send link to [email or web address]

## 2024-12-02 NOTE — PROGRESS NOTES
[facility] [address]  [facility] - [facility]  [address]  [facility] [address]  Dept: [phone]           [address]  [place] [address]  Tel.  [phone]  Fax. [phone] [address]  [place] [address]  Tel.  [phone]  Fax. [phone] [address] [address]  Tel.  [phone]  Fax. [phone]     Rasta Gutierrez, was evaluated through a synchronous (real-time) audio-video encounter. The patient (or guardian if applicable) is aware that this is a billable service, which includes applicable co-pays. This Virtual Visit was conducted with patient's (and/or legal guardian's) consent. Patient identification was verified, and a caregiver was present when appropriate.   The patient was located at Home: [address] [place] [address] [place] [address]  Provider was located at Facility (Appt Dept): [address]  [place] [address]  Confirm you are appropriately licensed, registered, or certified to deliver care in the [facility] where the patient is located as indicated above. If you are not or unsure, please re-schedule the visit: Yes, I confirm.      Total time spent for this encounter: Greater than 20  minutes spent in direct video patient care, chart review and planning    --Zeeshan Abdul MD on 12/2/2024 at 4:15 PM    An electronic signature was used to authenticate this note.   Chief Complaint       Chief Complaint   Patient presents with    Sleep Problem     NP referred by his cardiologist Dr. Galo for sleep study. Consent [place]. Send link to [email or web address]       [facility]      Rasta Gutierrez is 69 y.o. male seen for evaluation of a sleep disorder.     The patient reports recent cardiology evaluation for slow heart rate/sick sinus syndrome.  Seen for potential sleep disordered breathing.      He normally retires between 10-11 PM and will get out of bed between 6-7 AM.  May awaken 2-3

## 2024-12-06 ENCOUNTER — HOSPITAL ENCOUNTER (OUTPATIENT)
Facility: HOSPITAL | Age: 69
Discharge: HOME | End: 2024-12-09
Payer: MEDICARE

## 2024-12-06 VITALS
WEIGHT: 206 LBS | SYSTOLIC BLOOD PRESSURE: 144 MMHG | TEMPERATURE: 98.1 F | OXYGEN SATURATION: 95 % | HEIGHT: 69 IN | BODY MASS INDEX: 30.51 KG/M2 | DIASTOLIC BLOOD PRESSURE: 76 MMHG | HEART RATE: 45 BPM | RESPIRATION RATE: 18 BRPM

## 2024-12-06 DIAGNOSIS — G47.33 OSA (OBSTRUCTIVE SLEEP APNEA): ICD-10-CM

## 2024-12-06 PROCEDURE — 95810 POLYSOM 6/> YRS 4/> PARAM: CPT | Performed by: SPECIALIST

## 2024-12-14 ENCOUNTER — CLINICAL DOCUMENTATION (OUTPATIENT)
Age: 69
End: 2024-12-14

## 2024-12-14 DIAGNOSIS — G47.33 OSA (OBSTRUCTIVE SLEEP APNEA): Primary | ICD-10-CM

## 2024-12-14 DIAGNOSIS — G47.31 CENTRAL SLEEP APNEA: ICD-10-CM

## 2024-12-14 NOTE — PROGRESS NOTES
PSG performed for potential sleep disordered breathing.  466 minutes recorded of which 395 minutes of sleep with a sleep efficiency of 84.8%.  Sleep onset at 12.3 minutes; REM onset at 77 minutes with total REM representing 27.1% of sleep time.    137 respiratory events occurred of which 21 hypopnea at 116 apnea (33 central, 2 mixed, 81 obstructive).  Overall AHI 20.8/h.  Events more probably supine with supine related AHI 43.7/h.  Minimal SpO2 87%.    Impression: Sleep-disordered breathing, severe when supine.  Central events more prominent in rem sleep.    Recommendation: Titration study.  Obtain supine sleep.    Sleep technologist: Please review study results with the patient.  Order has been entered for titration study.

## 2024-12-17 ENCOUNTER — HOSPITAL ENCOUNTER (OUTPATIENT)
Facility: HOSPITAL | Age: 69
Discharge: HOME | End: 2024-12-20
Attending: SPECIALIST
Payer: MEDICARE

## 2024-12-17 VITALS
WEIGHT: 206 LBS | SYSTOLIC BLOOD PRESSURE: 132 MMHG | OXYGEN SATURATION: 95 % | HEART RATE: 43 BPM | DIASTOLIC BLOOD PRESSURE: 76 MMHG | TEMPERATURE: 97.7 F | HEIGHT: 69 IN | BODY MASS INDEX: 30.51 KG/M2

## 2024-12-17 DIAGNOSIS — G47.33 OSA (OBSTRUCTIVE SLEEP APNEA): ICD-10-CM

## 2024-12-17 DIAGNOSIS — G47.31 CENTRAL SLEEP APNEA: ICD-10-CM

## 2024-12-17 PROCEDURE — 95811 POLYSOM 6/>YRS CPAP 4/> PARM: CPT | Performed by: SPECIALIST

## 2024-12-17 PROCEDURE — 95811 POLYSOM 6/>YRS CPAP 4/> PARM: CPT

## 2024-12-22 ENCOUNTER — CLINICAL DOCUMENTATION (OUTPATIENT)
Age: 69
End: 2024-12-22

## 2024-12-22 DIAGNOSIS — G47.33 OSA (OBSTRUCTIVE SLEEP APNEA): Primary | ICD-10-CM

## 2024-12-23 ENCOUNTER — CLINICAL DOCUMENTATION (OUTPATIENT)
Age: 69
End: 2024-12-23

## 2024-12-23 NOTE — PROGRESS NOTES
PAP order emailed to [employer] and patient notified via [employer]. Also scheduled patient for first adherence appointment  as well.

## 2024-12-24 ENCOUNTER — ANCILLARY PROCEDURE (OUTPATIENT)
Age: 69
End: 2024-12-24
Payer: MEDICARE

## 2024-12-24 VITALS
BODY MASS INDEX: 30.51 KG/M2 | DIASTOLIC BLOOD PRESSURE: 80 MMHG | WEIGHT: 206 LBS | SYSTOLIC BLOOD PRESSURE: 135 MMHG | HEIGHT: 69 IN

## 2024-12-24 DIAGNOSIS — I49.5 SSS (SICK SINUS SYNDROME) (HCC): ICD-10-CM

## 2024-12-24 PROCEDURE — 93306 TTE W/DOPPLER COMPLETE: CPT | Performed by: STUDENT IN AN ORGANIZED HEALTH CARE EDUCATION/TRAINING PROGRAM

## 2024-12-26 LAB
ECHO AO ASC DIAM: 3.6 CM
ECHO AO ASCENDING AORTA INDEX: 1.72 CM/M2
ECHO AO ROOT DIAM: 3.6 CM
ECHO AO ROOT INDEX: 1.72 CM/M2
ECHO AV AREA PEAK VELOCITY: 2.4 CM2
ECHO AV AREA VTI: 2.6 CM2
ECHO AV AREA/BSA PEAK VELOCITY: 1.1 CM2/M2
ECHO AV AREA/BSA VTI: 1.2 CM2/M2
ECHO AV MEAN GRADIENT: 4 MMHG
ECHO AV MEAN VELOCITY: 1 M/S
ECHO AV PEAK GRADIENT: 9 MMHG
ECHO AV PEAK VELOCITY: 1.5 M/S
ECHO AV VELOCITY RATIO: 0.8
ECHO AV VTI: 34 CM
ECHO BSA: 2.13 M2
ECHO EST RA PRESSURE: 8 MMHG
ECHO LA DIAMETER INDEX: 1.82 CM/M2
ECHO LA DIAMETER: 3.8 CM
ECHO LA TO AORTIC ROOT RATIO: 1.06
ECHO LA VOL A-L A2C: 73 ML (ref 18–58)
ECHO LA VOL A-L A4C: 87 ML (ref 18–58)
ECHO LA VOL MOD A2C: 70 ML (ref 18–58)
ECHO LA VOL MOD A4C: 80 ML (ref 18–58)
ECHO LA VOLUME AREA LENGTH: 81 ML
ECHO LA VOLUME INDEX A-L A2C: 35 ML/M2 (ref 16–34)
ECHO LA VOLUME INDEX A-L A4C: 42 ML/M2 (ref 16–34)
ECHO LA VOLUME INDEX AREA LENGTH: 39 ML/M2 (ref 16–34)
ECHO LA VOLUME INDEX MOD A2C: 33 ML/M2 (ref 16–34)
ECHO LA VOLUME INDEX MOD A4C: 38 ML/M2 (ref 16–34)
ECHO LV E' LATERAL VELOCITY: 8.97 CM/S
ECHO LV E' SEPTAL VELOCITY: 6.98 CM/S
ECHO LV EDV A2C: 137 ML
ECHO LV EDV A4C: 137 ML
ECHO LV EDV BP: 141 ML (ref 67–155)
ECHO LV EDV INDEX A4C: 66 ML/M2
ECHO LV EDV INDEX BP: 67 ML/M2
ECHO LV EDV NDEX A2C: 66 ML/M2
ECHO LV EJECTION FRACTION A2C: 63 %
ECHO LV EJECTION FRACTION A4C: 56 %
ECHO LV EJECTION FRACTION BIPLANE: 59 % (ref 55–100)
ECHO LV ESV A2C: 51 ML
ECHO LV ESV A4C: 60 ML
ECHO LV ESV BP: 57 ML (ref 22–58)
ECHO LV ESV INDEX A2C: 24 ML/M2
ECHO LV ESV INDEX A4C: 29 ML/M2
ECHO LV ESV INDEX BP: 27 ML/M2
ECHO LV FRACTIONAL SHORTENING: 28 % (ref 28–44)
ECHO LV INTERNAL DIMENSION DIASTOLE INDEX: 2.2 CM/M2
ECHO LV INTERNAL DIMENSION DIASTOLIC: 4.6 CM (ref 4.2–5.9)
ECHO LV INTERNAL DIMENSION SYSTOLIC INDEX: 1.58 CM/M2
ECHO LV INTERNAL DIMENSION SYSTOLIC: 3.3 CM
ECHO LV IVSD: 1.1 CM (ref 0.6–1)
ECHO LV MASS 2D: 192.9 G (ref 88–224)
ECHO LV MASS INDEX 2D: 92.3 G/M2 (ref 49–115)
ECHO LV POSTERIOR WALL DIASTOLIC: 1.2 CM (ref 0.6–1)
ECHO LV RELATIVE WALL THICKNESS RATIO: 0.52
ECHO LVOT AREA: 3.1 CM2
ECHO LVOT AV VTI INDEX: 0.81
ECHO LVOT DIAM: 2 CM
ECHO LVOT MEAN GRADIENT: 3 MMHG
ECHO LVOT PEAK GRADIENT: 5 MMHG
ECHO LVOT PEAK VELOCITY: 1.2 M/S
ECHO LVOT STROKE VOLUME INDEX: 41.5 ML/M2
ECHO LVOT SV: 86.7 ML
ECHO LVOT VTI: 27.6 CM
ECHO MV A VELOCITY: 1.01 M/S
ECHO MV AREA PHT: 2.4 CM2
ECHO MV AREA VTI: 2.4 CM2
ECHO MV E DECELERATION TIME (DT): 312.2 MS
ECHO MV E VELOCITY: 0.75 M/S
ECHO MV E/A RATIO: 0.74
ECHO MV E/E' LATERAL: 8.36
ECHO MV E/E' RATIO (AVERAGED): 9.55
ECHO MV E/E' SEPTAL: 10.74
ECHO MV LVOT VTI INDEX: 1.3
ECHO MV MAX VELOCITY: 1.1 M/S
ECHO MV MEAN GRADIENT: 1 MMHG
ECHO MV MEAN VELOCITY: 0.5 M/S
ECHO MV PEAK GRADIENT: 4 MMHG
ECHO MV PRESSURE HALF TIME (PHT): 90.5 MS
ECHO MV VTI: 36 CM
ECHO PV MAX VELOCITY: 1.1 M/S
ECHO PV PEAK GRADIENT: 5 MMHG
ECHO RIGHT VENTRICULAR SYSTOLIC PRESSURE (RVSP): 39 MMHG
ECHO RV FREE WALL PEAK S': 14.4 CM/S
ECHO RV INTERNAL DIMENSION: 4.2 CM
ECHO RV TAPSE: 2.8 CM (ref 1.7–?)
ECHO RVOT PEAK GRADIENT: 2 MMHG
ECHO RVOT PEAK VELOCITY: 0.8 M/S
ECHO TV REGURGITANT MAX VELOCITY: 2.77 M/S
ECHO TV REGURGITANT PEAK GRADIENT: 31 MMHG

## 2024-12-31 ENCOUNTER — ANCILLARY PROCEDURE (OUTPATIENT)
Age: 69
End: 2024-12-31
Payer: MEDICARE

## 2024-12-31 VITALS
WEIGHT: 206 LBS | BODY MASS INDEX: 30.51 KG/M2 | HEIGHT: 69 IN | HEART RATE: 55 BPM | SYSTOLIC BLOOD PRESSURE: 128 MMHG | DIASTOLIC BLOOD PRESSURE: 78 MMHG

## 2024-12-31 DIAGNOSIS — I49.5 SSS (SICK SINUS SYNDROME) (HCC): ICD-10-CM

## 2024-12-31 LAB
ECHO BSA: 2.13 M2
STRESS ANGINA INDEX: 0
STRESS BASELINE DIAS BP: 78 MMHG
STRESS BASELINE HR: 52 BPM
STRESS BASELINE ST DEPRESSION: 0 MM
STRESS BASELINE SYS BP: 128 MMHG
STRESS ESTIMATED WORKLOAD: 10.1 METS
STRESS EXERCISE DUR MIN: 6 MIN
STRESS EXERCISE DUR SEC: 11 SEC
STRESS O2 SAT PEAK: 98 %
STRESS O2 SAT REST: 97 %
STRESS PEAK DIAS BP: 84 MMHG
STRESS PEAK SYS BP: 182 MMHG
STRESS PERCENT HR ACHIEVED: 86 %
STRESS POST PEAK HR: 131 BPM
STRESS RATE PRESSURE PRODUCT: NORMAL BPM*MMHG
STRESS TARGET HR: 152 BPM

## 2024-12-31 PROCEDURE — 93017 CV STRESS TEST TRACING ONLY: CPT | Performed by: INTERNAL MEDICINE

## 2024-12-31 PROCEDURE — 93016 CV STRESS TEST SUPVJ ONLY: CPT | Performed by: INTERNAL MEDICINE

## 2024-12-31 PROCEDURE — 93018 CV STRESS TEST I&R ONLY: CPT | Performed by: INTERNAL MEDICINE

## 2025-01-21 ENCOUNTER — OFFICE VISIT (OUTPATIENT)
Age: 70
End: 2025-01-21
Payer: MEDICARE

## 2025-01-21 VITALS
HEART RATE: 50 BPM | BODY MASS INDEX: 31.55 KG/M2 | SYSTOLIC BLOOD PRESSURE: 128 MMHG | DIASTOLIC BLOOD PRESSURE: 80 MMHG | OXYGEN SATURATION: 97 % | HEIGHT: 69 IN | WEIGHT: 213 LBS

## 2025-01-21 DIAGNOSIS — R53.83 FATIGUE, UNSPECIFIED TYPE: ICD-10-CM

## 2025-01-21 DIAGNOSIS — R00.1 BRADYCARDIA: ICD-10-CM

## 2025-01-21 DIAGNOSIS — I49.5 SSS (SICK SINUS SYNDROME) (HCC): Primary | ICD-10-CM

## 2025-01-21 PROCEDURE — 1123F ACP DISCUSS/DSCN MKR DOCD: CPT | Performed by: STUDENT IN AN ORGANIZED HEALTH CARE EDUCATION/TRAINING PROGRAM

## 2025-01-21 PROCEDURE — 3017F COLORECTAL CA SCREEN DOC REV: CPT | Performed by: STUDENT IN AN ORGANIZED HEALTH CARE EDUCATION/TRAINING PROGRAM

## 2025-01-21 PROCEDURE — G8417 CALC BMI ABV UP PARAM F/U: HCPCS | Performed by: STUDENT IN AN ORGANIZED HEALTH CARE EDUCATION/TRAINING PROGRAM

## 2025-01-21 PROCEDURE — 1036F TOBACCO NON-USER: CPT | Performed by: STUDENT IN AN ORGANIZED HEALTH CARE EDUCATION/TRAINING PROGRAM

## 2025-01-21 PROCEDURE — 99213 OFFICE O/P EST LOW 20 MIN: CPT | Performed by: STUDENT IN AN ORGANIZED HEALTH CARE EDUCATION/TRAINING PROGRAM

## 2025-01-21 PROCEDURE — 1159F MED LIST DOCD IN RCRD: CPT | Performed by: STUDENT IN AN ORGANIZED HEALTH CARE EDUCATION/TRAINING PROGRAM

## 2025-01-21 PROCEDURE — G8427 DOCREV CUR MEDS BY ELIG CLIN: HCPCS | Performed by: STUDENT IN AN ORGANIZED HEALTH CARE EDUCATION/TRAINING PROGRAM

## 2025-01-21 NOTE — PROGRESS NOTES
Chief Complaint   Patient presents with    SSS    Fatigue    Hypertension     Vitals:    01/21/25 1533   BP: 128/80   Site: Left Upper Arm   Position: Sitting   Pulse: 50   SpO2: 97%   Weight: 96.6 kg (213 lb)   Height: 1.753 m (5' 9\")       Chest pain NO     ER, urgent care, or hospitalized outside of [facility] since your last visit?  NO     Refills NO   
96.6 kg (213 lb)   Height: 1.753 m (5' 9\")       Constitutional:  well-developed and well-nourished. No distress.  HENT: Normocephalic.   Eyes: No scleral icterus.   Neck:  Neck supple. No JVD present.   Pulmonary/Chest: Effort normal and breath sounds normal. No respiratory distress, wheezes or rales.  Cardiovascular: Normal rate, regular rhythm, S1 S2 . Exam reveals no gallop and no friction rub.  No murmur heard.  No edema.  Extremities:  Normal muscle tone  Abdominal:   No abnormal distension.   Neurological:  Moving all extremities, cranial nerves appear grossly intact.  Skin: Skin is not cold.  Not diaphoretic. No erythema.   Psychiatric:  Grossly normal mood and affect.  Intact insight.    Objective Data:    Investigations personally reviewed and interpreted    ECG: 10/31/2024.  Sinus bradycardia with a heart rate of 42 bpm, no significant ST-T wave changes.  No evidence of AV conduction delay.          Investigations reviewed     12/24/24    ECHO (TTE) COMPLETE (PRN CONTRAST/BUBBLE/STRAIN/3D) 12/26/2024  1:31 PM (Final)    Interpretation Summary    Left Ventricle: Normal left ventricular systolic function with a visually estimated EF of 55 - 60%. EF by 2D Simpsons Biplane is 59%. Left ventricle size is normal. Mildly increased wall thickness. Ventricular mass is normal. Findings consistent with concentric remodeling. Normal wall motion. Diastolic dysfunction present with normal LV EF.    Mitral Valve: Mild regurgitation.    Tricuspid Valve: Mild regurgitation. The estimated RVSP is 39 mmHg.    Left Atrium: Left atrium is moderately dilated.    Image quality is adequate.    Signed by: Siddhartha Poe DO on 12/26/2024  1:31 PM    12/31/24    STRESS TEST ONLY EXERCISE 12/31/2024  2:24 PM (Final)    Interpretation Summary    ECG: Resting ECG demonstrates normal sinus rhythm.    ECG: The stress ECG was negative for ischemia.    Stress Test: A Allen protocol stress test was performed. Overall, the patient's exercise

## 2025-02-28 ENCOUNTER — OFFICE VISIT (OUTPATIENT)
Age: 70
End: 2025-02-28
Payer: MEDICARE

## 2025-02-28 VITALS
WEIGHT: 210.2 LBS | HEIGHT: 69 IN | OXYGEN SATURATION: 92 % | DIASTOLIC BLOOD PRESSURE: 79 MMHG | HEART RATE: 53 BPM | SYSTOLIC BLOOD PRESSURE: 146 MMHG | BODY MASS INDEX: 31.13 KG/M2 | TEMPERATURE: 97.7 F

## 2025-02-28 DIAGNOSIS — G47.31 CENTRAL SLEEP APNEA: ICD-10-CM

## 2025-02-28 DIAGNOSIS — Z78.9 INTOLERANCE OF CONTINUOUS POSITIVE AIRWAY PRESSURE (CPAP) VENTILATION: ICD-10-CM

## 2025-02-28 DIAGNOSIS — G47.33 OSA (OBSTRUCTIVE SLEEP APNEA): Primary | ICD-10-CM

## 2025-02-28 PROCEDURE — 99213 OFFICE O/P EST LOW 20 MIN: CPT | Performed by: SPECIALIST

## 2025-02-28 PROCEDURE — 1036F TOBACCO NON-USER: CPT | Performed by: SPECIALIST

## 2025-02-28 PROCEDURE — 1159F MED LIST DOCD IN RCRD: CPT | Performed by: SPECIALIST

## 2025-02-28 PROCEDURE — 3017F COLORECTAL CA SCREEN DOC REV: CPT | Performed by: SPECIALIST

## 2025-02-28 PROCEDURE — 1123F ACP DISCUSS/DSCN MKR DOCD: CPT | Performed by: SPECIALIST

## 2025-02-28 PROCEDURE — G8427 DOCREV CUR MEDS BY ELIG CLIN: HCPCS | Performed by: SPECIALIST

## 2025-02-28 PROCEDURE — G8417 CALC BMI ABV UP PARAM F/U: HCPCS | Performed by: SPECIALIST

## 2025-02-28 ASSESSMENT — SLEEP AND FATIGUE QUESTIONNAIRES
HOW LIKELY ARE YOU TO NOD OFF OR FALL ASLEEP WHILE SITTING AND TALKING TO SOMEONE: SLIGHT CHANCE OF DOZING
HOW LIKELY ARE YOU TO NOD OFF OR FALL ASLEEP WHEN YOU ARE A PASSENGER IN A CAR FOR AN HOUR WITHOUT A BREAK: MODERATE CHANCE OF DOZING
ESS TOTAL SCORE: 12
HOW LIKELY ARE YOU TO NOD OFF OR FALL ASLEEP WHILE SITTING INACTIVE IN A PUBLIC PLACE: SLIGHT CHANCE OF DOZING
HOW LIKELY ARE YOU TO NOD OFF OR FALL ASLEEP IN A CAR, WHILE STOPPED FOR A FEW MINUTES IN TRAFFIC: SLIGHT CHANCE OF DOZING
HOW LIKELY ARE YOU TO NOD OFF OR FALL ASLEEP WHILE LYING DOWN TO REST IN THE AFTERNOON WHEN CIRCUMSTANCES PERMIT: MODERATE CHANCE OF DOZING
HOW LIKELY ARE YOU TO NOD OFF OR FALL ASLEEP WHILE WATCHING TV: MODERATE CHANCE OF DOZING
HOW LIKELY ARE YOU TO NOD OFF OR FALL ASLEEP WHILE SITTING AND READING: MODERATE CHANCE OF DOZING
HOW LIKELY ARE YOU TO NOD OFF OR FALL ASLEEP WHILE SITTING QUIETLY AFTER LUNCH WITHOUT ALCOHOL: SLIGHT CHANCE OF DOZING

## 2025-02-28 NOTE — PROGRESS NOTES
[facility] - [address]  [facility] - [place]  [address] [facility] [address]  Dept: [phone]              [address]  [place] [address]  Tel.  [phone]  Fax. [phone] [address]  [place] [address]  Tel.  [phone]  Fax. [phone] [address] [place] [address]  Tel.  [phone]  Fax. [phone]         Chief Complaint       Chief Complaint   Patient presents with    Sleep Problem     Discuss other treatment options          HPI        Rasta Gutierrez is a 69 y.o. male seen for follow-up. He was evaluated with a sleep study which demonstrated sleep-disordered breathing characterized by 137 respiratory events of which 21 hypopnea and 116 apnea (33 central, 2 mixed, 81 obstructive).  Overall AHI 20.8/h.  Events more prominent supine with the supine-related AHI 43.7/h.  Minimal SaO2 87%.      Titration study performed.CPAP initiated 5 cm and increased to 10 cm.  When recording 10 cm.  AHI 0/h.  At 9 cm CPAP: 71.7 minutes recorded which 61.2 minutes of sleep and 1.2 minutes and REM.  AHI 2/h.  Minimal SaO2 91%.  Patient was lateral at the final pressure settings.     Impression: Sleep disordered breathing responding to CPAP increased to 10 cm     Recommendation: APAP 9-15 cm. F&P Vitera FFM (M)     Patient is hesitant to use PAP therapy.  Further treatment options were reviewed.      Allergies   Allergen Reactions    Oxycodone-Acetaminophen Rash    Sulfa Antibiotics Other (See Comments)       No current facility-administered medications for this visit.     He  has a past medical history of Asthma, Basal cell carcinoma, Chronic sinus bradycardia, Current mild episode of major depressive disorder without prior episode, Generalized headaches, Insomnia, Internal hemorrhoids, Low back pain, SHELLIE (obstructive sleep apnea), Pericarditis, Plantar fasciitis of left foot, Radicular pain of right lower

## 2025-04-18 ENCOUNTER — TELEPHONE (OUTPATIENT)
Facility: CLINIC | Age: 70
End: 2025-04-18

## 2025-04-18 NOTE — TELEPHONE ENCOUNTER
Attempted to contact patient regarding upcoming Medicare wellness appointment and completion of HRA questionnaire. LVM for patient to please return call at  [phone]

## 2025-04-21 ENCOUNTER — OFFICE VISIT (OUTPATIENT)
Facility: CLINIC | Age: 70
End: 2025-04-21
Payer: MEDICARE

## 2025-04-21 VITALS
TEMPERATURE: 98.2 F | HEART RATE: 44 BPM | DIASTOLIC BLOOD PRESSURE: 70 MMHG | OXYGEN SATURATION: 96 % | SYSTOLIC BLOOD PRESSURE: 136 MMHG | WEIGHT: 207.8 LBS | HEIGHT: 69 IN | RESPIRATION RATE: 14 BRPM | BODY MASS INDEX: 30.78 KG/M2

## 2025-04-21 DIAGNOSIS — J45.20 MILD INTERMITTENT ASTHMA, UNSPECIFIED WHETHER COMPLICATED: ICD-10-CM

## 2025-04-21 DIAGNOSIS — Z12.11 SCREENING FOR COLON CANCER: ICD-10-CM

## 2025-04-21 DIAGNOSIS — Z12.5 SCREENING FOR PROSTATE CANCER: ICD-10-CM

## 2025-04-21 DIAGNOSIS — E78.00 ELEVATED CHOLESTEROL: ICD-10-CM

## 2025-04-21 DIAGNOSIS — Z00.00 INITIAL MEDICARE ANNUAL WELLNESS VISIT: Primary | ICD-10-CM

## 2025-04-21 DIAGNOSIS — R00.1 CHRONIC SINUS BRADYCARDIA: ICD-10-CM

## 2025-04-21 LAB
ALBUMIN SERPL-MCNC: 4.2 G/DL (ref 3.5–5)
ALBUMIN/GLOB SERPL: 1.2 (ref 1.1–2.2)
ALP SERPL-CCNC: 76 U/L (ref 45–117)
ALT SERPL-CCNC: 29 U/L (ref 12–78)
ANION GAP SERPL CALC-SCNC: 4 MMOL/L (ref 2–12)
AST SERPL-CCNC: 16 U/L (ref 15–37)
BILIRUB SERPL-MCNC: 0.4 MG/DL (ref 0.2–1)
BUN SERPL-MCNC: 18 MG/DL (ref 6–20)
BUN/CREAT SERPL: 17 (ref 12–20)
CALCIUM SERPL-MCNC: 9.1 MG/DL (ref 8.5–10.1)
CHLORIDE SERPL-SCNC: 106 MMOL/L (ref 97–108)
CHOLEST SERPL-MCNC: 217 MG/DL
CO2 SERPL-SCNC: 28 MMOL/L (ref 21–32)
CREAT SERPL-MCNC: 1.03 MG/DL (ref 0.7–1.3)
ERYTHROCYTE [DISTWIDTH] IN BLOOD BY AUTOMATED COUNT: 12.1 % (ref 11.5–14.5)
GLOBULIN SER CALC-MCNC: 3.6 G/DL (ref 2–4)
GLUCOSE SERPL-MCNC: 94 MG/DL (ref 65–100)
HCT VFR BLD AUTO: 48 % (ref 36.6–50.3)
HDLC SERPL-MCNC: 65 MG/DL
HDLC SERPL: 3.3 (ref 0–5)
HGB BLD-MCNC: 15.9 G/DL (ref 12.1–17)
LDLC SERPL CALC-MCNC: 134.4 MG/DL (ref 0–100)
MCH RBC QN AUTO: 31.4 PG (ref 26–34)
MCHC RBC AUTO-ENTMCNC: 33.1 G/DL (ref 30–36.5)
MCV RBC AUTO: 94.7 FL (ref 80–99)
NRBC # BLD: 0 K/UL (ref 0–0.01)
NRBC BLD-RTO: 0 PER 100 WBC
PLATELET # BLD AUTO: 244 K/UL (ref 150–400)
PMV BLD AUTO: 11.9 FL (ref 8.9–12.9)
POTASSIUM SERPL-SCNC: 4.1 MMOL/L (ref 3.5–5.1)
PROT SERPL-MCNC: 7.8 G/DL (ref 6.4–8.2)
PSA SERPL-MCNC: 0.3 NG/ML (ref 0.01–4)
RBC # BLD AUTO: 5.07 M/UL (ref 4.1–5.7)
SODIUM SERPL-SCNC: 138 MMOL/L (ref 136–145)
TRIGL SERPL-MCNC: 88 MG/DL
VLDLC SERPL CALC-MCNC: 17.6 MG/DL
WBC # BLD AUTO: 6.3 K/UL (ref 4.1–11.1)

## 2025-04-21 PROCEDURE — 1160F RVW MEDS BY RX/DR IN RCRD: CPT | Performed by: NURSE PRACTITIONER

## 2025-04-21 PROCEDURE — 1123F ACP DISCUSS/DSCN MKR DOCD: CPT | Performed by: NURSE PRACTITIONER

## 2025-04-21 PROCEDURE — G0439 PPPS, SUBSEQ VISIT: HCPCS | Performed by: NURSE PRACTITIONER

## 2025-04-21 PROCEDURE — 1159F MED LIST DOCD IN RCRD: CPT | Performed by: NURSE PRACTITIONER

## 2025-04-21 PROCEDURE — 3017F COLORECTAL CA SCREEN DOC REV: CPT | Performed by: NURSE PRACTITIONER

## 2025-04-21 SDOH — ECONOMIC STABILITY: FOOD INSECURITY: WITHIN THE PAST 12 MONTHS, YOU WORRIED THAT YOUR FOOD WOULD RUN OUT BEFORE YOU GOT MONEY TO BUY MORE.: NEVER TRUE

## 2025-04-21 SDOH — ECONOMIC STABILITY: FOOD INSECURITY: WITHIN THE PAST 12 MONTHS, THE FOOD YOU BOUGHT JUST DIDN'T LAST AND YOU DIDN'T HAVE MONEY TO GET MORE.: NEVER TRUE

## 2025-04-21 ASSESSMENT — PATIENT HEALTH QUESTIONNAIRE - PHQ9
SUM OF ALL RESPONSES TO PHQ QUESTIONS 1-9: 0
1. LITTLE INTEREST OR PLEASURE IN DOING THINGS: NOT AT ALL
SUM OF ALL RESPONSES TO PHQ QUESTIONS 1-9: 0
2. FEELING DOWN, DEPRESSED OR HOPELESS: NOT AT ALL

## 2025-04-21 ASSESSMENT — LIFESTYLE VARIABLES
HOW OFTEN DO YOU HAVE A DRINK CONTAINING ALCOHOL: NEVER
HOW MANY STANDARD DRINKS CONTAINING ALCOHOL DO YOU HAVE ON A TYPICAL DAY: PATIENT DOES NOT DRINK

## 2025-04-21 NOTE — PATIENT INSTRUCTIONS
Learning About Being Active as an Older Adult  Why is being active important as you get older?     Being active is one of the best things you can do for your health. And it's never too late to start. Being active--or getting active, if you aren't already--has definite benefits. It can:  Give you more energy,  Keep your mind sharp.  Improve balance to reduce your risk of falls.  Help you manage chronic illness with fewer medicines.  No matter how old you are, how fit you are, or what health problems you have, there is a form of activity that will work for you. And the more physical activity you can do, the better your overall health will be.  What kinds of activity can help you stay healthy?  Being more active will make your daily activities easier. Physical activity includes planned exercise and things you do in daily life. There are four types of activity:  Aerobic.  Doing aerobic activity makes your heart and lungs strong.  Includes walking, dancing, and gardening.  Aim for at least 2½ hours spread throughout the week.  It improves your energy and can help you sleep better.  Muscle-strengthening.  This type of activity can help maintain muscle and strengthen bones.  Includes climbing stairs, using resistance bands, and lifting or carrying heavy loads.  Aim for at least twice a week.  It can help protect the knees and other joints.  Stretching.  Stretching gives you better range of motion in joints and muscles.  Includes upper arm stretches, calf stretches, and gentle yoga.  Aim for at least twice a week, preferably after your muscles are warmed up from other activities.  It can help you function better in daily life.  Balancing.  This helps you stay coordinated and have good posture.  Includes heel-to-toe walking, hetal chi, and certain types of yoga.  Aim for at least 3 days a week.  It can reduce your risk of falling.  Even if you have a hard time meeting the recommendations, it's better to be more active

## 2025-04-21 NOTE — PROGRESS NOTES
Medicare Annual Wellness Visit    Rasta Guteirrez is here for Medicare AWV    Assessment & Plan   Initial Medicare annual wellness visit:  Yearly AWV, dental and vision exams recommended  Lab work ordered  Recommended vaccines discussed  Colon and prostate cancer screening guidelines discussed  Skin cancer prevention and monitoring discussed  Healthy lifestyle options discussed.     Chronic sinus bradycardia: Asymptomatic. Continue to follow up with cardiologist  -     Comprehensive Metabolic Panel; Future  -     CBC; Future  Elevated cholesterol: Diet-controlled. Will check lipids.   -     Lipid Panel; Future  -     Comprehensive Metabolic Panel; Future  Mild intermittent asthma, unspecified whether complicated: Denies recent asthma flare-up. Continue with Symbicort inhaler daily and albuterol inhaler as needed   -     CBC; Future  Screening for prostate cancer  -     PSA Screening; Future  Screening for colon cancer  -     Cologuard (Fecal DNA Colorectal Cancer Screening)       Return in 1 year (on 4/21/2026) for Medicare AWV.     Subjective   The following acute and/or chronic problems were also addressed today:  Mr. Gutierrez presents today for AWV. Lives with wife, he is retired. PMH includes elevated cholesterol, asthma, sinus bradycardia, SHELLIE. Follows up with pulmonologist every 6-12 years. [facility]. Follows up with dermatologist yearly. Denies chest pain, shortness of breath.     Patient's complete Health Risk Assessment and screening values have been reviewed and are found in Flowsheets. The following problems were reviewed today and where indicated follow up appointments were made and/or referrals ordered.    Positive Risk Factor Screenings with Interventions:              Inactivity:  On average, how many days per week do you engage in moderate to strenuous exercise (like a brisk walk)?: 2 days (!) Abnormal  On average, how many minutes do you engage in

## 2025-04-22 ENCOUNTER — RESULTS FOLLOW-UP (OUTPATIENT)
Facility: CLINIC | Age: 70
End: 2025-04-22

## 2025-07-02 NOTE — PATIENT INSTRUCTIONS
I Medication Refills:    Medication  phosphorus (K PHOS NEUTRAL) [phone] MG tablet [[id number]]  phosphorus (K PHOS NEUTRAL) [id number] MG tablet [[id number]]    Order Details    Dose: 2 tablet Route: Oral Frequency: 2 TIMES DAILY   Dispense Quantity: 120 tablet Refills: 3          Sig: Take 2 tablets by mouth 2 times daily           Pharmacy    [facility] [id number] - [place], [place] - [address] - P [phone] - F [phone]  [facility] [address] [place] [address]  Phone: [phone]  Fax: [phone]         Last Office Visit: 02/20/25    Next Office Visit: 07/10/25    Last Refill: 03/14/25    Last Labs: 05/16/25   Tennis Elbow: Exercises  Your Care Instructions  Here are some examples of typical rehabilitation exercises for your condition. Start each exercise slowly. Ease off the exercise if you start to have pain. Your doctor or physical therapist will tell you when you can start these exercises and which ones will work best for you. How to do the exercises  Wrist flexor stretch    1. Extend your arm in front of you with your palm up. 2. Bend your wrist, pointing your hand toward the floor. 3. With your other hand, gently bend your wrist farther until you feel a mild to moderate stretch in your forearm. 4. Hold for at least 15 to 30 seconds. Repeat 2 to 4 times. Wrist extensor stretch    1. Repeat steps 1 to 4 of the stretch above but begin with your extended hand palm down. Ball or sock squeeze    1. Hold a tennis ball (or a rolled-up sock) in your hand. 2. Make a fist around the ball (or sock) and squeeze. 3. Hold for about 6 seconds, and then relax for up to 10 seconds. 4. Repeat 8 to 12 times. 5. Switch the ball (or sock) to your other hand and do 8 to 12 times. Wrist deviation    1. Sit so that your arm is supported but your hand hangs off the edge of a flat surface, such as a table. 2. Hold your hand out like you are shaking hands with someone. 3. Move your hand up and down. 4. Repeat this motion 8 to 12 times. 5. Switch arms. 6. Try to do this exercise twice with each hand. Wrist curls    1. Place your forearm on a table with your hand hanging over the edge of the table, palm up. 2. Place a 1- to 2-pound weight in your hand. This may be a dumbbell, a can of food, or a filled water bottle. 3. Slowly raise and lower the weight while keeping your forearm on the table and your palm facing up. 4. Repeat this motion 8 to 12 times. 5. Switch arms, and do steps 1 through 4.  6. Repeat with your hand facing down toward the floor. Switch arms. Biceps curls    1.  Sit leaning forward with your legs slightly spread and your left hand on your left thigh. 2. Place your right elbow on your right thigh, and hold the weight with your forearm horizontal.  3. Slowly curl the weight up and toward your chest.  4. Repeat this motion 8 to 12 times. 5. Switch arms, and do steps 1 through 4. Follow-up care is a key part of your treatment and safety. Be sure to make and go to all appointments, and call your doctor if you are having problems. It's also a good idea to know your test results and keep a list of the medicines you take. Where can you learn more? Go to http://nuha-leandro.info/. Enter K610 in the search box to learn more about \"Tennis Elbow: Exercises. \"  Current as of: March 21, 2017  Content Version: 11.4  © 7548-7918 [employer]. Care instructions adapted under license by [employer] (which disclaims liability or warranty for this information). If you have questions about a medical condition or this instruction, always ask your healthcare professional. [address] any warranty or liability for your use of this information.

## 2025-08-28 ENCOUNTER — OFFICE VISIT (OUTPATIENT)
Facility: CLINIC | Age: 70
End: 2025-08-28
Payer: MEDICARE

## 2025-08-28 VITALS
DIASTOLIC BLOOD PRESSURE: 78 MMHG | SYSTOLIC BLOOD PRESSURE: 130 MMHG | BODY MASS INDEX: 30.42 KG/M2 | HEIGHT: 69 IN | OXYGEN SATURATION: 96 % | HEART RATE: 50 BPM | RESPIRATION RATE: 17 BRPM | WEIGHT: 205.4 LBS | TEMPERATURE: 97.6 F

## 2025-08-28 DIAGNOSIS — R42 DIZZY SPELLS: ICD-10-CM

## 2025-08-28 DIAGNOSIS — G47.33 OSA (OBSTRUCTIVE SLEEP APNEA): ICD-10-CM

## 2025-08-28 DIAGNOSIS — H53.9 TRANSIENT VISION DISTURBANCE: ICD-10-CM

## 2025-08-28 DIAGNOSIS — E78.00 HYPERCHOLESTEROLEMIA: ICD-10-CM

## 2025-08-28 DIAGNOSIS — Z82.3 FHX: STROKE: ICD-10-CM

## 2025-08-28 DIAGNOSIS — R51.9 CHRONIC DAILY HEADACHE: ICD-10-CM

## 2025-08-28 DIAGNOSIS — R29.818 TRANSIENT NEUROLOGICAL SYMPTOMS: Primary | ICD-10-CM

## 2025-08-28 PROCEDURE — 3017F COLORECTAL CA SCREEN DOC REV: CPT | Performed by: INTERNAL MEDICINE

## 2025-08-28 PROCEDURE — G8427 DOCREV CUR MEDS BY ELIG CLIN: HCPCS | Performed by: INTERNAL MEDICINE

## 2025-08-28 PROCEDURE — 1159F MED LIST DOCD IN RCRD: CPT | Performed by: INTERNAL MEDICINE

## 2025-08-28 PROCEDURE — 1036F TOBACCO NON-USER: CPT | Performed by: INTERNAL MEDICINE

## 2025-08-28 PROCEDURE — 99214 OFFICE O/P EST MOD 30 MIN: CPT | Performed by: INTERNAL MEDICINE

## 2025-08-28 PROCEDURE — 1123F ACP DISCUSS/DSCN MKR DOCD: CPT | Performed by: INTERNAL MEDICINE

## 2025-08-28 PROCEDURE — 1126F AMNT PAIN NOTED NONE PRSNT: CPT | Performed by: INTERNAL MEDICINE

## 2025-08-28 PROCEDURE — G8417 CALC BMI ABV UP PARAM F/U: HCPCS | Performed by: INTERNAL MEDICINE

## 2025-08-28 RX ORDER — ASPIRIN 81 MG/1
81 TABLET ORAL DAILY
Qty: 90 TABLET | Refills: 1
Start: 2025-08-28

## 2025-09-03 ENCOUNTER — PATIENT MESSAGE (OUTPATIENT)
Facility: CLINIC | Age: 70
End: 2025-09-03